# Patient Record
Sex: FEMALE | Race: OTHER | URBAN - METROPOLITAN AREA
[De-identification: names, ages, dates, MRNs, and addresses within clinical notes are randomized per-mention and may not be internally consistent; named-entity substitution may affect disease eponyms.]

---

## 2021-10-22 LAB
AMB EXT ANTIBODY SCREEN: NEGATIVE
AMB EXT CHLAMYDIA, NUCLEIC ACID AMP: NEGATIVE
AMB EXT GONOCOCCUS, NUCLEIC ACID AMP: NEGATIVE
AMB EXT HBSAG SCREEN: NEGATIVE
AMB EXT HEMATOCRIT: 39.9
AMB EXT HEMOGLOBIN: 13.4
AMB EXT HIV AG AB COMBO: NON REACTIVE
AMB EXT MCV: 92
AMB EXT PLATELETS: 206
AMB EXT RH FACTOR: POSITIVE
AMB EXT TREPONEMAL ANTIBODIES: NON REACTIVE

## 2022-04-09 ENCOUNTER — OFFICE VISIT (OUTPATIENT)
Dept: OBGYN CLINIC | Facility: CLINIC | Age: 27
End: 2022-04-09
Payer: MEDICAID

## 2022-04-09 VITALS
HEART RATE: 97 BPM | HEIGHT: 64 IN | BODY MASS INDEX: 22.2 KG/M2 | WEIGHT: 130 LBS | SYSTOLIC BLOOD PRESSURE: 96 MMHG | DIASTOLIC BLOOD PRESSURE: 65 MMHG

## 2022-04-09 DIAGNOSIS — N92.6 MISSED MENSES: Primary | ICD-10-CM

## 2022-04-09 PROBLEM — O34.219 PREVIOUS CESAREAN DELIVERY AFFECTING PREGNANCY: Status: ACTIVE | Noted: 2021-10-22

## 2022-04-09 PROCEDURE — 3008F BODY MASS INDEX DOCD: CPT | Performed by: ADVANCED PRACTICE MIDWIFE

## 2022-04-09 PROCEDURE — 3078F DIAST BP <80 MM HG: CPT | Performed by: ADVANCED PRACTICE MIDWIFE

## 2022-04-09 PROCEDURE — 99203 OFFICE O/P NEW LOW 30 MIN: CPT | Performed by: ADVANCED PRACTICE MIDWIFE

## 2022-04-09 PROCEDURE — 3074F SYST BP LT 130 MM HG: CPT | Performed by: ADVANCED PRACTICE MIDWIFE

## 2022-04-09 RX ORDER — PRENATAL VIT/IRON FUM/FOLIC AC 27MG-0.8MG
1 TABLET ORAL DAILY
COMMUNITY

## 2022-04-11 ENCOUNTER — NURSE ONLY (OUTPATIENT)
Dept: OBGYN CLINIC | Facility: CLINIC | Age: 27
End: 2022-04-11
Payer: MEDICAID

## 2022-04-11 DIAGNOSIS — Z34.83 ENCOUNTER FOR SUPERVISION OF OTHER NORMAL PREGNANCY IN THIRD TRIMESTER: Primary | ICD-10-CM

## 2022-04-13 NOTE — TELEPHONE ENCOUNTER
Spoke to patient, advised attempted to call [facility] for OP report and was unable to make the call. Patient advised she is to reach out to them and have them fax report to our office. Advised if they are unable to send, patient is to get their fax # and notify the office so that we can send the JAMES to them. Patient also advised she is to reach out to the office in [facility] she had her ultrasound done 12/2021 and have results fax to out office.  Patient agrees with plan and verbally understands

## 2022-04-13 NOTE — PROGRESS NOTES
Tigre Vasquez, is at 31w2d, here for her NOB visit. Currently, she is feeling well. Denies 3rd trimester danger signs. Accepts Tdap today. Has reviewed the information regarding  and wants TOLAC. Vital signs and weight reviewed  See flowsheets & Prenatal Physical    Patient Active Problem List:     Previous  delivery affecting pregnancy     Today's Assessment/Plan: 3T labs in process, Tdap given. JAMES today for prenatal records and prior OP report. Next visit: 2 weeks    Reviewed:   Prenatal visit schedule  Recommendations and rationale for Tdap in pregnancy  Emergency contact info  3rd trimester precautions and expectations   labor precautions  Kick counts  Danger signs    Pt verbalized understanding. All questions answered.  No barriers to learning identified

## 2022-04-13 NOTE — TELEPHONE ENCOUNTER
----- Message from Dariusz Leyva CNM sent at 4/13/2022 11:31 AM CDT -----  Normal glucose results in pregnancy. Please call .

## 2022-04-13 NOTE — TELEPHONE ENCOUNTER
Notified pt of results per MBW using Language line  #[id number].  Pt verbalized an understanding & agrees w/ plan

## 2022-04-27 NOTE — PROGRESS NOTES
Active fetus  Denies any vaginal bleeding, leaking of fluid or vaginal discharge. No signs signs of PTL. Reviewed S&S of PTL  Warning signs reviewed  All questions answered.   Reports back, hip and leg pain- PT referral  Reports racing heartbeat -cardiology referral  Reports thick white vaginal discharge - vaginosis screen completed

## 2022-04-30 NOTE — TELEPHONE ENCOUNTER
----- Message from Nanci Latif CNM sent at 4/29/2022  4:40 PM CDT -----  Mongolian speaking: Yeast on vaginal culture use medication as prescribed

## 2022-05-19 NOTE — PROGRESS NOTES
Feeling well. Endorses regular fetal movement. Denies LOF, vaginal bleeding, contractions. Discussed importance of records from prior . They have tried calling the clinic but have not gotten an answer. Encouraged them to keep trying. Also discussed consult with MDs. Provided contact information and instructed to schedule for this week. GBS today. Reviewed warning signs and when to call.  VLADISLAV 1wk

## 2022-05-23 NOTE — TELEPHONE ENCOUNTER
----- Message from Amrit Quintero CNM sent at 5/23/2022  5:51 PM CDT -----  Nigerien speaking  your Beta Hemolytic Strep Group B or GBS testing is negative, therefore you will not need routine antibiotics in labor. Please call the office if you have any questions. We look forward to seeing you for your next visit.

## 2022-05-24 NOTE — TELEPHONE ENCOUNTER
Was informed by phone room, they spoke to patient's  was was advised patient has [employer] choice as medicaid replacement.  believes [employer] [facility] will be active after June 1, 2022. PA form submitted for visit schedule 5/25/22 with olegario.

## 2022-05-25 NOTE — PROGRESS NOTES
Active fetus Increasing BH contractions. Denies any leaking of fluid or bleeding. Reviewed S&S labor  Warning signs reviewed  All questions answered.   Pt has appt with Dr Lenz Later tomorrow

## 2022-05-25 NOTE — TELEPHONE ENCOUNTER
Patient with [employer] better [employer] plan. Prior authorization initiated by Manish CLARKE for  CPT code 0829P. Fax received from [facility] that prior authorization is not required for CPT code [phone]. Sent to scanning.

## 2022-05-26 NOTE — TELEPHONE ENCOUNTER
Please schedule the following surgery:    Procedure: Repeat   Assist: (Y/N or none) Yes  Date: 2022. Dr Marguerite Oliveira On Call. Dx: Previous   Pre-op appt: (Y/N or n/a) n/a  Admission type: (IN/OUT/OBVS) OBVS  Department of discharge(SDS/Floor): [facility]  Expected length of stay: 4 days  Procedure length time (please enter amount you are requesting): 1.5 hours  Recovery time (patients always ask): 6 weeks  Medical Clearance: (Y/N) No      ALL Medicaid/including BCBS community: Tubal/ Hyst form MUST be signed (30 days): COVID19 Lab 5062 needs to be placed for all C-sections, IOL & Versions     Message to nurses:  Patient to be NPO after midnight and to go for Pre-Op and Covid Testing prior to day of Surgery. Call [facility] to schedule surgery on Date written above.

## 2022-05-26 NOTE — TELEPHONE ENCOUNTER
Scheduled for 06/06/2022 at 0930. Orders placed for a CBC, Type&Screen, and a Covid test.     Called pt using  [id number]. Pt informed of above. Aware to be NPO after midnight.

## 2022-06-02 NOTE — PROGRESS NOTES
Patient feels like she is leaking amniotic fluid. She reports that her belly gets hard and she feels cramps occasionally. Reports good fetal movement. Staying hydrated, no other pregnancy complaints. Sterile speculum exam showed no fluid pooling, Nitrazine negative, no ferning under microscope. Cervix closed and thick, -3, cephalic. Reviewed plan of repeat  scheduled for 2022 with Dr. Tye Roberts. Instructed to call for signs of labor. Pt verbalized understanding and questions answered.

## 2022-06-06 NOTE — PLAN OF CARE
Problem: PAIN - ADULT  Goal: Verbalizes/displays adequate comfort level or patient's stated pain goal  Description: INTERVENTIONS:  - Encourage pt to monitor pain and request assistance  - Assess pain using appropriate pain scale  - Administer analgesics based on type and severity of pain and evaluate response  - Implement non-pharmacological measures as appropriate and evaluate response  - Consider cultural and social influences on pain and pain management  - Manage/alleviate anxiety  - Utilize distraction and/or relaxation techniques  - Monitor for opioid side effects  - Notify MD/LIP if interventions unsuccessful or patient reports new pain  - Anticipate increased pain with activity and pre-medicate as appropriate  6/6/2022 1503 by Brenna Lara RN  Outcome: Progressing  6/6/2022 1503 by Brenna Lara RN  Outcome: Progressing     Problem: ANXIETY  Goal: Will report anxiety at manageable levels  Description: INTERVENTIONS:  - Administer medication as ordered  - Teach and rehearse alternative coping skills  - Provide emotional support with 1:1 interaction with staff  6/6/2022 1503 by Brenna Lara RN  Outcome: Progressing  6/6/2022 1503 by Brenna Lara RN  Outcome: Progressing     Problem: POSTPARTUM  Goal: Long Term Goal:Experiences normal postpartum course  Description: INTERVENTIONS:  - Assess and monitor vital signs and lab values. - Assess fundus and lochia. - Provide ice/sitz baths for perineum discomfort. - Monitor healing of incision/episiotomy/laceration, and assess for signs and symptoms of infection and hematoma. - Assess bladder function and monitor for bladder distention.  - Provide/instruct/assist with pericare as needed. - Provide VTE prophylaxis as needed. - Monitor bowel function.  - Encourage ambulation and provide assistance as needed. - Assess and monitor emotional status and provide social service/psych resources as needed.   - Utilize standard precautions and use personal protective equipment as indicated. Ensure aseptic care of all intravenous lines and invasive tubes/drains.  - Obtain immunization and exposure to communicable diseases history. Outcome: Progressing  Goal: Optimize infant feeding at the breast  Description: INTERVENTIONS:  - Initiate breast feeding within first hour after birth. - Monitor effectiveness of current breast feeding efforts. - Assess support systems available to mother/family.  - Identify cultural beliefs/practices regarding lactation, letdown techniques, maternal food preferences. - Assess mother's knowledge and previous experience with breast feeding.  - Provide information as needed about early infant feeding cues (e.g., rooting, lip smacking, sucking fingers/hand) versus late cue of crying.  - Discuss/demonstrate breast feeding aids (e.g., infant sling, nursing footstool/pillows, and breast pumps). - Encourage mother/other family members to express feelings/concerns, and actively listen. - Educate father/SO about benefits of breast feeding and how to manage common lactation challenges. - Recommend avoidance of specific medications or substances incompatible with breast feeding.  - Assess and monitor for signs of nipple pain/trauma. - Instruct and provide assistance with proper latch. - Review techniques for milk expression (breast pumping) and storage of breast milk. Provide pumping equipment/supplies, instructions and assistance, as needed. - Encourage rooming-in and breast feeding on demand.  - Encourage skin-to-skin contact. - Provide LC support as needed. - Assess for and manage engorgement. - Provide breast feeding education handouts and information on community breast feeding support. Outcome: Progressing  Goal: Establishment of adequate milk supply with medication/procedure interruptions  Description: INTERVENTIONS:  - Review techniques for milk expression (breast pumping).    - Provide pumping equipment/supplies, instructions, and assistance until it is safe to breastfeed infant. Outcome: Progressing  Goal: Appropriate maternal -  bonding  Description: INTERVENTIONS:  - Assess caregiver- interactions. - Assess caregiver's emotional status and coping mechanisms. - Encourage caregiver to participate in  daily care. - Assess support systems available to mother/family.  - Provide /case management support as needed.   Outcome: Progressing

## 2022-06-06 NOTE — PLAN OF CARE
Problem: BIRTH - VAGINAL/ SECTION  Goal: Fetal and maternal status remain reassuring during the birth process  Description: INTERVENTIONS:  - Monitor vital signs  - Monitor fetal heart rate  - Monitor uterine activity  - Monitor labor progression (vaginal delivery)  - DVT prophylaxis (C/S delivery)  - Surgical antibiotic prophylaxis (C/S delivery)  Outcome: Progressing     Problem: PAIN - ADULT  Goal: Verbalizes/displays adequate comfort level or patient's stated pain goal  Description: INTERVENTIONS:  - Encourage pt to monitor pain and request assistance  - Assess pain using appropriate pain scale  - Administer analgesics based on type and severity of pain and evaluate response  - Implement non-pharmacological measures as appropriate and evaluate response  - Consider cultural and social influences on pain and pain management  - Manage/alleviate anxiety  - Utilize distraction and/or relaxation techniques  - Monitor for opioid side effects  - Notify MD/LIP if interventions unsuccessful or patient reports new pain  - Anticipate increased pain with activity and pre-medicate as appropriate  Outcome: Progressing     Problem: ANXIETY  Goal: Will report anxiety at manageable levels  Description: INTERVENTIONS:  - Administer medication as ordered  - Teach and rehearse alternative coping skills  - Provide emotional support with 1:1 interaction with staff  Outcome: Progressing     Problem: Patient Centered Care  Goal: Patient preferences are identified and integrated in the patient's plan of care  Description: Interventions:  - What would you like us to know as we care for you?  N/a  - Provide timely, complete, and accurate information to patient/family  - Incorporate patient and family knowledge, values, beliefs, and cultural backgrounds into the planning and delivery of care  - Encourage patient/family to participate in care and decision-making at the level they choose  - Honor patient and family perspectives and choices  Outcome: Progressing

## 2022-06-06 NOTE — ADDENDUM NOTE
Addendum  created 06/06/22 1559 by Curlene Castleman, MD    Clinical Note Signed, Intraprocedure Blocks edited

## 2022-06-06 NOTE — ANESTHESIA POSTPROCEDURE EVALUATION
Patient: Ashlyn Waggoner    Procedure Summary     Date: 22 Room / Location: [facility] L+D OR  [facility] L+D OR    Anesthesia Start: 1004 Anesthesia Stop: 105    Procedure:  SECTION (N/A ) Diagnosis: (H/o previous )    Surgeons: Mark Correa MD Anesthesiologist: Maynor Nagel MD    Anesthesia Type: spinal ASA Status: 2          Anesthesia Type: spinal    Vitals Value Taken Time   BP 96/58 22 1059   Temp 97.6 22 1059   Pulse 89 22 1059   Resp 16 22 1059   SpO2 99% 22 1059       [facility] AN Post Evaluation:   Patient Evaluated in PACU  Patient Participation: complete - patient participated  Level of Consciousness: awake  Pain Score: 0  Pain Management: adequate  Airway Patency:patent  Dental exam unchanged from preop  Yes    Cardiovascular Status: acceptable  Respiratory Status: acceptable  Postoperative Hydration acceptable      Cheri Lynn MD  2022 10:59 AM

## 2022-06-06 NOTE — L&D DELIVERY NOTE
[facility]    Post-Operative Note    [address] Patient Status:  Inpatient    6/10/1995 MRN [id number]   Location [address]  Attending Aditi Gibbs MD   Hosp Day # 0 PCP No primary care provider on file. Indications: prior c/s declines raad   Pre-operative Diagnosis: 39w0d  Post-operative Diagnosis: same as above    Procedure: Procedure(s):   SECTION    Findings:  1. There was viable male delivered in the vtx position. 2 There was a normal appearing placenta. 3. There was a normal appearing uterus, tubes, and ovaries. Complications: None; patient tolerated the procedure well. Surgeon(s) and Role:     * Aditi Gibbs MD - Primary     * Verner Forte, MD - Surgical Assistant    Procedure Details:    After obtaining consent, the patient was taken to the operative suite where she was administered spinal anesthesia. Fetal heart tones were obtained. Epperson catheter was anchored and the patient was prepped and draped in a sterile fashion. Time-out was performed. Anesthesia was checked and found to be adequate. A Pfannenstiel skin incision was made and carried down sharply to the rectus fascia. The rectus fascia was incised in midline and extended bluntly. Peritoneum was identified and entered bluntly. A Mobius retractor was then placed. In the lower uterine segment, a low transverse incision was made with a scalpel and extended bluntly. Amniotomy was performed and clear fluid was noted. The infant was delivered in the vertex presentation without difficulty. Infant was vigorous and crying on the surgical field. Delayed cord clamping x 30 seconds was performed. Cord doubly clamped and cut. Infant was taken to the  team in attendance. Placenta was then extracted from the uterus manually after collecting cord blood. Moist lap sponges were used to curette the endometrium.   The hysterotomy was then grasped with ring forceps and reapproximated using a #1 Vicryl suture in a running locked fashion. A second layer of #1 Vicryl  was used to imbricate the first layer and close the bladder flap. Good hemostasis was noted. Lateral gutters were wiped clear free of blood clot. Tubes and ovaries were inspected and normal.  Hysterotomy was inspected for a final time and again hemostatic. Interceed was then placed over the hysterotomy. Subfascial tissue was inspected and hemostatic. The fascia was then reapproximated using a 0 looped PDS suture in a running fashion. Subcutaneous tissue was wiped clean of blood and clot and the subcutaneous tissue was closed using a 0 plain suture in a running fashion. The skin was then closed using a subcuticular stitch with a 3-0 Monocryl. Dermabond was placed over the incision. Patient tolerated the procedure well. She was transferred to recovery room in stable condition. There were no immediate complications. Dr Jeremiah Arteaga was present for the entire procedure and assisted with dissection in the abdominal cavity, delivery of the baby and the repair of the abdominal cavity.        Alexi Jeffery [[id number]]    Labor Events     labor?: No  Antibiotics received during labor?: No  Rupture type: AROM  Fluid color: Clear  Induction: None  Augmentation: None  Intrapartum & labor complications: None      Presentation    Presentation: Vertex  Position: Right Occiput Anterior     Operative Delivery    Operative Vaginal Delivery: No            Shoulder Dystocia    Shoulder Dystocia: No     Anesthesia    Method: Spinal          [place] Delivery    Head delivery date/time: 2022 10:30:47   Delivery date/time:  22 10:30:50   Delivery type: Caesarean Section    Details:  Trial of labor after : No    categorization: Repeat    priority: scheduled   Indications for : Prior Uterine Surgery   Skin incision type: 1 Pfannenstiel   Uterine Incision type: Low Transverse      Delivery location: delivery room  Delivery Room Temperature: 74     Delivery Providers    Delivering Clinician: Katiuska Musa MD   Delivery personnel:  Provider Role   Rosi Rowland, RN Baby Nurse   Jodi Mosley, SJ Delivery Nurse   Tasia Mittal Surgical Tech   Lilia Fang MD Surgeon   Ella Mccullough MD Neonatologist   Juana Eagle MD Anesthesiologist         Cord    Vessels: 3 Vessels  Complications: None  # of loops: 0  Timed cord clamping: Yes  Time in sec: 60  Cord blood disposition: to lab  Gases sent?: No     Resuscitation    Method: None     [place] Measurements    Weight: 3110 g 6 lb 13.7 oz Length: 52.1 cm   Head circum. : 35 cm          Placenta    Date/time: 2022 1031  Removal: Manual Removal  Appearance: Intact  Disposition: Pathology     Apgars    Living status: Living   Apgar Scoring Key:    0 1 2    Skin color Blue or pale Acrocyanotic Completely pink    Heart rate Absent <100 bpm >100 bpm    Reflex irritability No response Grimace Cry or active withdrawal    Muscle tone Limp Some flexion Active motion    Respiratory effort Absent Weak cry; hypoventilation Good, crying              1 Minute:  5 Minute:  10 Minute:  15 Minute:  20 Minute:    Skin color: 1  1       Heart rate: 2  2       Reflex irritablity: 2  2       Muscle tone: 2  2       Respiratory effort: 2  2       Total: 9  9          Apgars assigned by: JAKUB VANESSA     Skin to Skin    Skin to skin initiated date/time: 2022 1113  Skin to skin with:  Mother     Vaginal Count    No data filed     Delivery (Maternal)    Episiotomy: None  Perineal lacerations: None    Vaginal laceration?: No    Cervical laceration?: No    Clitoral laceration?: No Repaired?: No   Quantitative blood loss (mL): 505                Genny Rodriguez MD  2022  11:32 AM

## 2022-06-06 NOTE — PROGRESS NOTES
Received patient into room 366 via cart . Bedside shift report received from [employer]. Pt transferred to bed. Bed in lowest and locked position. Side rails up x2. VSS. IV site unremarkable. Baby present in open crib. ID bands matched with L&D RN. Patient and family oriented to unit, room and call light within reach. Safety measures in place, POC followed. Will continue to monitor per protocol. Advised to call for assistance to bathroom.

## 2022-06-06 NOTE — ANESTHESIA PROCEDURE NOTES
Spinal Block  Performed by: Nathan Boateng MD  Authorized by: Nathan Boateng MD       General Information and Staff    Start Time:  6/6/2022 10:08 AM  End Time:  6/6/2022 10:11 AM  Anesthesiologist:  Nathan Boateng MD  Performed by:   Anesthesiologist  Patient Location:  OB  Site identification: surface landmarks    Reason for Block: at surgeon's request, post-op pain management and surgical anesthesia    Preanesthetic Checklist: patient identified, IV checked, risks and benefits discussed, monitors and equipment checked, pre-op evaluation, timeout performed, anesthesia consent and sterile technique used      Procedure Details    Patient Position:  Sitting  Prep: ChloraPrep and patient draped    Monitoring:  Cardiac monitor, heart rate and continuous pulse ox  Approach:  Midline  Location:  L3-4  Injection Technique:  Single-shot    Needle    Needle Type:  Pencil-tip  Needle Gauge:  24 G  Needle Length:  3.5 in    Assessment    Sensory Level:  T4  Events: clear CSF, CSF aspirated, well tolerated and blood negative      Additional Comments

## 2022-06-06 NOTE — PROGRESS NOTES
Pt is a 27year old female admitted to TR5/TR5-A. Patient presents with:  Scheduled      Pt is  39w0d intra-uterine pregnancy. History obtained, consents signed. Oriented to room, staff, and plan of care.

## 2022-06-07 NOTE — LACTATION NOTE
LACTATION NOTE - MOTHER      Evaluation Type: Inpatient    Problems identified  Problems identified: Knowledge deficit    Maternal history  Maternal history: Caesarean section;PPH  Other/comment: PPH 1500ml & abx    Breastfeeding goal  Breastfeeding goal: To maintain breast milk feeding per patient goal    Maternal Assessment  Bilateral Breasts: Soft;Symmetrical  Bilateral Nipples: Everted;WNL  Prior breastfeeding experience (comment below): First baby to breast;Multip  Prior BF experience: comment: None  Breastfeeding Assistance: Breastfeeding assistance provided with permission    Pain assessment  Location/Comment: Nipples  Pain scale comment: denies  Treatment of Sore Nipples: Coconut oil; Lanolin;Deeper latch techniques; Expressed breast milk (reinforce preventatively)    Guidelines for use of:  Current use of pump[de-identified] None  Other (comment): Assisted mom to latch infant in a football hold position on the  left breast. Demonstrated proper postioning techniques and used supportive pillows to help facilitate deep latch. Mom verbalized improved latch and notable difference in nipple discomfort. Deep latch was achieve, with strong coordinated sucks/swalows noted. Demonstrated hand compression & encouraged compression to increase milk transfer. Reinforced I&O expectations, adequate signs of lactation,  behaviors, feeding cues, gentle wake stimulation, Reinforced basic  breastfeeding edu, supply & demand. stages of milk production, frequency, when to expect milk to come in. . Encourage to call prn.

## 2022-06-07 NOTE — PROGRESS NOTES
1518 This RN went into the room to assess pt. Fundus seemed well above Umbilicus. Called Charge RN to assess and verify with this RN. Charge RN expressed a large amount of blood/clots. [facility] emergency called. Notified Dr. Omer Hernández who came to examine patient at bedside and manually expressed clots. Refer to [facility] for meds administered. Flowsheet for QBL. Continue to monitor patient/and bleeding/ no further orders at this time. 

## 2022-06-07 NOTE — OPERATIVE REPORT
[facility]    Post-Operative Note    [address] Patient Status:  Inpatient    6/10/1995 MRN [id number]   Location [address] Attending Mark Correa MD    Day # 0 PCP No primary care provider on file. Indications: prior c/s declines raad   Pre-operative Diagnosis: 39w0d  Post-operative Diagnosis: same as above    Procedure: Procedure(s):   SECTION    Findings:  1. There was viable male delivered in the vtx position. 2 There was a normal appearing placenta. 3. There was a normal appearing uterus, tubes, and ovaries. Complications: None; patient tolerated the procedure well. Surgeon(s) and Role:     * Mark Correa MD - Primary     * Kaylyn Stone MD - Surgical Assistant    Procedure Details:    After obtaining consent, the patient was taken to the operative suite where she was administered spinal anesthesia. Fetal heart tones were obtained. Epperson catheter was anchored and the patient was prepped and draped in a sterile fashion. Time-out was performed. Anesthesia was checked and found to be adequate. A Pfannenstiel skin incision was made and carried down sharply to the rectus fascia. The rectus fascia was incised in midline and extended bluntly. Peritoneum was identified and entered bluntly. A Mobius retractor was then placed. In the lower uterine segment, a low transverse incision was made with a scalpel and extended bluntly. Amniotomy was performed and clear fluid was noted. The infant was delivered in the vertex presentation without difficulty. Infant was vigorous and crying on the surgical field. Delayed cord clamping x 30 seconds was performed. Cord doubly clamped and cut. Infant was taken to the  team in attendance. Placenta was then extracted from the uterus manually after collecting cord blood. Moist lap sponges were used to curette the endometrium.   The hysterotomy was then grasped with ring forceps and reapproximated using a #1 Vicryl suture in a running locked fashion. A second layer of #1 Vicryl  was used to imbricate the first layer and close the bladder flap. Good hemostasis was noted. Lateral gutters were wiped clear free of blood clot. Tubes and ovaries were inspected and normal.  Hysterotomy was inspected for a final time and again hemostatic. Interceed was then placed over the hysterotomy. Subfascial tissue was inspected and hemostatic. The fascia was then reapproximated using a 0 looped PDS suture in a running fashion. Subcutaneous tissue was wiped clean of blood and clot and the subcutaneous tissue was closed using a 0 plain suture in a running fashion. The skin was then closed using a subcuticular stitch with a 3-0 Monocryl. Dermabond was placed over the incision. Patient tolerated the procedure well. She was transferred to recovery room in stable condition. There were no immediate complications. Dr Lesa Niño was present for the entire procedure and assisted with dissection in the abdominal cavity, delivery of the baby and the repair of the abdominal cavity.        Venda Gaucher, Boy [[id number]]    Labor Events     labor?: No  Antibiotics received during labor?: No  Rupture type: AROM  Fluid color: Clear  Induction: None  Augmentation: None  Intrapartum & labor complications: None     [place] Presentation    Presentation: Vertex  Position: Right Occiput Anterior     Operative Delivery    Operative Vaginal Delivery: No            Shoulder Dystocia    Shoulder Dystocia: No     Anesthesia    Method: Spinal          [place] Delivery    Head delivery date/time: 2022 10:30:47   Delivery date/time:  22 10:30:50   Delivery type: Caesarean Section    Details:  Trial of labor after : No    categorization: Repeat    priority: scheduled   Indications for : Prior Uterine Surgery   Skin incision type: 1 Pfannenstiel   Uterine Incision type: Low Transverse      Delivery location: delivery room  Delivery Room Temperature: 74     Delivery Providers    Delivering Clinician: Mary Larios MD   Delivery personnel:  Provider Role   Berry Bright, RN Baby Nurse   Marija Bailey, RN Delivery Nurse   Maylin Henderson Surgical Tech   Chandra Naqvi MD Surgeon   Hien Rutherford MD Neonatologist   Azam Michael MD Anesthesiologist         Cord    Vessels: 3 Vessels  Complications: None  # of loops: 0  Timed cord clamping: Yes  Time in sec: 60  Cord blood disposition: to lab  Gases sent?: No     Resuscitation    Method: None      Measurements    Weight: 3110 g 6 lb 13.7 oz Length: 52.1 cm   Head circum. : 35 cm          Placenta    Date/time: 2022 1031  Removal: Manual Removal  Appearance: Intact  Disposition: Pathology     Apgars    Living status: Living   Apgar Scoring Key:    0 1 2    Skin color Blue or pale Acrocyanotic Completely pink    Heart rate Absent <100 bpm >100 bpm    Reflex irritability No response Grimace Cry or active withdrawal    Muscle tone Limp Some flexion Active motion    Respiratory effort Absent Weak cry; hypoventilation Good, crying              1 Minute:  5 Minute:  10 Minute:  15 Minute:  20 Minute:    Skin color: 1  1       Heart rate: 2  2       Reflex irritablity: 2  2       Muscle tone: 2  2       Respiratory effort: 2  2       Total: 9  9          Apgars assigned by: JAKUB VANESSA     Skin to Skin    Skin to skin initiated date/time: 2022 1113  Skin to skin with:  Mother     Vaginal Count    No data filed     Delivery (Maternal)    Episiotomy: None  Perineal lacerations: None    Vaginal laceration?: No    Cervical laceration?: No    Clitoral laceration?: No Repaired?: No   Quantitative blood loss (mL): 505                Lynda Alan MD  2022  11:32 AM

## 2022-06-07 NOTE — LACTATION NOTE
This note was copied from a baby's chart. LACTATION NOTE - INFANT    Evaluation Type  Evaluation Type: Inpatient    Problems & Assessment  Problems Diagnosed or Identified: Latch difficulty  Infant Assessment: Skin color: pink or appropriate for ethnicity; Minimal hunger cues present  Muscle tone: Appropriate for GA    Feeding Assessment  Summary Current Feeding: Adlib;Breastfeeding exclusively  Breastfeeding Assessment: Assisted with breastfeeding w/mother's permission;Coordinated suck/swallow;Deep latch achieved and observed;Calm and ready to breastfeed; Tolerated feeding well  Breastfeeding Positions: cross cradle  Latch: Grasps breast, tongue down, lips flanged, rhythmic sucking  Audible Sucks/Swallows: Spontaneous and intermittent (24 hours old)  Type of Nipple: Everted (after stimulation)  Comfort (Breast/Nipple): Soft/non-tender  Hold (Positioning): Full assist, teach one side, mother does other, staff holds  [facility] Score: 9

## 2022-06-07 NOTE — CM/SW NOTE
SW informed [facility] of pt's Medicaid status and provided PMAD/WIC resources [place] [facility] [place]     RHYS Byrd, [facility]  Social Work   GIQ:#[id number]

## 2022-06-07 NOTE — LACTATION NOTE
This note was copied from a baby's chart. LACTATION NOTE - INFANT    Evaluation Type  Evaluation Type: Inpatient    Problems & Assessment  Problems Diagnosed or Identified: Latch difficulty;Sleepy  Infant Assessment: Skin color: pink or appropriate for ethnicity; Minimal hunger cues present  Muscle tone: Appropriate for GA    Feeding Assessment  Summary Current Feeding: Adlib;Breastfeeding exclusively  Breastfeeding Assessment: Assisted with breastfeeding w/mother's permission;Calm and ready to breastfeed;Deep latch achieved and observed;Sustained nutrititive latch w/audible swallows; Coordinated suck/swallow  Breastfeeding Positions: left breast;right breast;cradle;football  Latch: Repeated attempts, hold nipple in mouth, stimulate to suck  Audible Sucks/Swallows: Spontaneous and intermittent (24 hours old)  Type of Nipple: Everted (after stimulation)  Comfort (Breast/Nipple): Soft/non-tender  Hold (Positioning): No assist from staff, mother able to position/hold infant  LATCH Score: 9  Other (comment): Assisted mom to latch infant in a football hold position on the  left breast. Demonstrated proper postioning techniques and used supportive pillows to help facilitate deep latch. Mom verbalized improved latch and notable difference in nipple discomfort. Deep latch was achieve, with strong coordinated sucks/swalows noted. Demonstrated hand compression & encouraged compression to increase milk transfer. Reinforced I&O expectations, adequate signs of lactation,  behaviors, feeding cues, gentle wake stimulation, Reinforced basic  breastfeeding edu, supply & demand. stages of milk production, frequency, when to expect milk to come in. . Encourage to call prn.

## 2022-06-07 NOTE — LACTATION NOTE
LACTATION NOTE - MOTHER      Evaluation Type: Inpatient    Problems identified  Problems identified: Knowledge deficit         Breastfeeding goal  Breastfeeding goal: To maintain breast milk feeding per patient goal    Maternal Assessment  Bilateral Breasts: Symmetrical;Soft  Bilateral Nipples: WNL; Everted  Prior breastfeeding experience (comment below): Multip  Breastfeeding Assistance: Breastfeeding assistance provided with permission         Guidelines for use of: Other (comment): Mother feeding in an unsupportive position when I entered the room. Educated on cross cradle position and deep latch techniques. Deep latch acheived. Infant latches with ease. Provided  breastfeeding education.

## 2022-06-08 NOTE — LACTATION NOTE
LACTATION NOTE - MOTHER      Evaluation Type: Inpatient    Problems identified  Problems identified: Knowledge deficit;Milk supply not WNL  Milk supply not WNL: Reduced (potential)  Problems Identified Other: Formula supplement         Breastfeeding goal  Breastfeeding goal: To maintain breast milk feeding per patient goal    Maternal Assessment  Prior breastfeeding experience (comment below): First baby to breast;Multip  Breastfeeding Assistance: Breastfeeding assistance provided with permission    Pain assessment  Pain scale comment: denies    Guidelines for use of:  Breast pump type: Ameda Platinum  Current use of pump[de-identified] Breast pump resource handout given  Suggested use of pump: Pump each time a supplement is offered;Pump if infant is not latching to breast  Other (comment): Mom reports BF going well, infant BF every 2-3 hours and supplemeting with about 10 ml formula however breast pump use inconsistent. Pumping guidelines discussed and encouraged to pump when supplement given to protect milk supply. Reviewed  breastfeeding education, including I/O and frequent feedings. Enc to call [facility] with questions or to schedule outpatient visit.

## 2022-06-08 NOTE — DISCHARGE SUMMARY
[place] [facility]    Discharge Summary    [address] Patient Status:  Inpatient    6/10/1995 MRN [id number]   Location [facility] 3SE Attending Ilsa Berg MD   Hosp Day # 2 PCP No primary care provider on file. Date of Admission: 2022   Date of Discharge: 2022    Admitting Diagnosis: Repeat  HERMAN    Pregnancy    Disposition: Home    Discharge Diagnosis: . Active Problems:    Previous  delivery affecting pregnancy    Pregnancy    Delayed postpartum hemorrhage      Hospital Course:   Reason for Admission: Repeat     Discharge Physical Exam: General appearance:  alert, appears stated age and cooperative  INCISION/WOUND: clean, dry and intact  Pulmonary: clear to auscultation bilaterally  Breasts:  normal appearance, no masses or tenderness  Cardiovascular: S1, S2 normal, no murmur, click, rub or gallop, regular rate and rhythm  Abdominal: soft, non-tender; bowel sounds normal; no masses,  no organomegaly  Extremities: extremities normal, atraumatic, no cyanosis or edema    Hospital Course: S/P Repeat LTCS. S/P postpartum hemorrhage. S/P blood transfusion. Stable post-op course. Patient desires discharge on POD #2. Complications: postpartum hemorrhage. Surgical Procedures     Case IDs Date Procedure Surgeon Location Status    [id number] 22  SECTION Ilsa Berg MD [facility] L[facility]D OR [facility]        Pending Labs     Order Current Status    Miscellaneous - Non Inf Lab In process    Specimen to Pathology Tissue In process          Discharge Plan:   Discharge Condition: Stable    Current Discharge Medication List    New Orders    ibuprofen 600 MG Oral Tab  Take 1 tablet (600 mg total) by mouth every 6 (six) hours as needed for Pain. Home Meds - Unchanged    Prenatal 27-0.8 MG Oral Tab  Take 1 tablet by mouth daily.               Discharge Diet: General diet    Discharge Activity: As tolerated    Follow up: Follow-up Information     Call [facility]. Specialty: GE PEDIATRICS  Why: As needed  Contact information:  [address] [facility] [address]  [facility]. Specialty: GE PEDIATRICS  Why: As needed  Contact information:  [address] [facility] [place] [address]  [phone]           Aleksandar Chan MD. Schedule an appointment as soon as possible for a visit in 2 weeks. Specialty: OBSTETRICS & GYNECOLOGY  Why: Post-Op Exam  Contact information:  [facility]  [phone]             Aleksandar Chan MD. Schedule an appointment as soon as possible for a visit in 6 weeks. Specialty: OBSTETRICS & GYNECOLOGY  Why: postpartum exam  Contact information:  [facility]  [phone]                                 Senait Radford MD  6/8/2022

## 2022-06-09 NOTE — PROGRESS NOTES
1st attempt OBGYN Post Partum apt request  (delivered 06/06)    Dr Julius Young [id number]  [phone]  Apt made:   2w w/Dr Hieu Levy - Mon 06/20 @1:15pm  6w w/Annette Hernandez - Mon 07/18 @1:00pm  Confirmed w/pt  Closing encounter

## 2022-06-20 NOTE — PROGRESS NOTES
No chief complaint on file. HPI: Nay Colon is a 32year old [de-identified] P female who presents for a postpartum visit. Delivery Date: 06/06/2022    Delivering Physician: Dr. Lindy Salinas    Type of Delivery: LTCS    Gestational Age: 39w0d    Prenatal Complications: n/a    Delivery Complications: n/a    Episiotomy/Laceration: n/a    Bleeding: n/a    Rubella Status/Vaccine: imm    Baby's Name: Bailey Whitten    [de-identified] Gender: Male    [de-identified] Birth Weight: 6lbs 13.7oz    Baby's Health: good health    Breast or Formula Feeding: both    Contraception: unsure    Depression Screen: scored 2    Last Pap: 08/28/2021 Annual needs to be scheduled    Concerns:no concerns    /60   Wt 122 lb (55.3 kg)   LMP 08/28/2021 (Exact Date)     Annual Physical Never done  Annual Depression Screen Never done  Pap Smear Never done  COVID-19 Vaccine(2 - Pfizer series) due on 11/12/2021  Influenza Vaccine Completed  Pneumococcal Vaccine: Birth to 64yrs Aged Out        Review of Systems   General: Not Present- Anorexia, Fatigue, Fever, Weight Gain > 10lbs. and Weight Loss > 10lbs. Zak Anis HEENT: Not Present- Headache, Visual Disturbances, Vertigo and Bleeding Gums. Breast: Not Present- Breast Mass, Breast Pain, Nipple Discharge and Nipple Pain. Gastrointestinal: Not Present- Abdominal Pain, Change in Bowel Habits, Nausea and Vomiting. Female Genitourinary: Not Present- Dysuria, Flank Pain, Frequency, Hematuria, Incontinence, Pelvic Pain, Urgency, Vaginal Bleeding and Vaginal Discharge. Psychiatric: Not Present- Anxiety, Change in Sleep Pattern and Depression. Endocrine: Not Present- Libido Change. Hematology: Not Present- Anemia, Easy Bruising, Prolonged Bleeding and Spontaneous Bleeding. All other systems negative       Physical Exam   The physical exam findings are as follows:       General   General Appearance - Well Developed and Well Nourished.       Integumentary   Global Assessment: Upon inspection and palpation of skin surfaces of the - Genitalia, groin, buttocks: no rashes, perineal lesions or condyloma. Abdomen   Inspection: Inspection of the abdomen reveals - No Hernias. Incisional scars -  scar. Palpation/Percussion: Palpation and Percussion of the abdomen reveal - Non Tender, No hepatosplenomegaly and No Palpable abdominal masses. Female Genitourinary     External Genitalia   Clitoris - Normal.  Labia Majora: Lesions - Bilateral - None. Characteristics - Bilateral - Normal.  Labia Minora: Lesions - Bilateral - None. Characteristics - Bilateral - Normal.  Urethra: Characteristics - Normal. Discharge - None. Vulva: Characteristics - Normal. Lesions - None. Speculum & Bimanual   Vagina:   Vaginal Wall: - Normal.  Cervix: Characteristics - No Motion tenderness or Lesions. Uterus: Characteristics - Normal.  Adnexa: Characteristics - Bilateral - Normal. Masses - No Adnexal Masses. Rectal   Anorectal Exam: External - normal external exam.    Discussed postpartum care, expectation for menses, contraception, and pregnancy timing. Discussed with patient activity level postpartum and when she can resume all normal activities. Encourage patient to continue her prenatal vitamin until done breast-feeding. Patient desires ocp at this point for birth control. Patient scheduled for her routine GYN exam.       1. Encounter for postpartum visit    2. Antepartum anemia  - CBC, PLATELET; NO DIFFERENTIAL;  Future  - FERRITIN; Future

## 2022-06-30 NOTE — TELEPHONE ENCOUNTER
From: [address]  To: Madyson Valiente MD  Sent: 6/29/2022 9:02 PM CDT  Subject: Vance Ganser im Josephus Benjamin my c section is red and still sleping is it normal i can send a picture so you can see!  Thanks

## 2022-06-30 NOTE — TELEPHONE ENCOUNTER
Regarding: Colonel Liang  ----- Message from Conrad Morrissey sent at 6/30/2022  9:26 AM CDT -----       ----- Message sent from Mason Gastelum RN to Jo [address] at 6/30/2022  7:16 AM -----   [facility],     If there is fluid draining from your incision, foul odor, redness, it is warm to touch or you have a fever. You should be evaluated. Please call our office anytime after 8 am to speak with a nurse further about your incision and to possibly schedule an office visit. Please call [phone]. Kind regards,     Roseline Sweeney RN      ----- Message -----       From:Davon Doe Alvarez       Sent:6/29/2022  9:02 PM CDT         To:Lady Oliveira MD    Subject:Dariana Curiel my c section is red and still sleping is it normal i can send a picture so you can see!  Thanks

## 2022-07-01 NOTE — TELEPHONE ENCOUNTER
Patient states she could not make her appointment on yesterday she want a nurse to call her to see if she can be seen today . ... Danish interpretor needed

## 2022-07-01 NOTE — TELEPHONE ENCOUNTER
Patient name and  verified. Patient with a history of repeat c/s on 22 by TA contacted office complaining of redness to incision site. Per patient states incision looks like it has dehisced with pus. Denies odor, chills, or fever. Patient failed yesterdays appt for incision check and this RN unable to offer appt at [facility] today due to no availability on RN or provider's schedule. Patient scheduled for today with  Ta.

## 2022-07-13 NOTE — TELEPHONE ENCOUNTER
Patient delivered on 6/6. Annual on 8/4 needs to be rescheduled. Is this too soon for an annual? Please advise.

## 2022-07-14 NOTE — TELEPHONE ENCOUNTER
Patient name and  verified. Patient complaining of redness around incision site with yellow discharge. Denies fever, odor, or chills. Patient would like to have incision checked. Appointment made for 7/15/2022 with TA. Aware of time and location.

## 2022-10-03 NOTE — TELEPHONE ENCOUNTER
Talked to the pharmacist at the [facility] on [address] in [address] [place] [address]. Pt to receive Dicloxacillin 500 mg (two 250 mg tabs) QID x 7 days.

## 2023-04-25 NOTE — TELEPHONE ENCOUNTER
----- Message from Donita Mattson MD sent at 4/21/2023  7:41 PM CDT -----  Please notify patient that her vaginosis panel was positive for yeast.  I have sent a prescription to her pharmacy.

## 2023-04-27 NOTE — TELEPHONE ENCOUNTER
----- Message from Jill Pearce MD sent at 4/21/2023  7:41 PM CDT -----  Please notify patient that her vaginosis panel was positive for yeast.  I have sent a prescription to her pharmacy.
No vm set up
No voicemail set up. Unable to leave a message. [employer] message sent to kristen Sexton
VM not set up, unable to leave VM.
Lynn Linton  (RN)  2021 22:46:07

## 2023-06-07 NOTE — TELEPHONE ENCOUNTER
Pt is calling Pt had ultrasound of her breast fr breast pain .  Pt said there was nothing wrong but still having breast pain ,

## 2023-06-13 NOTE — TELEPHONE ENCOUNTER
PSR please call pt to schedule new pt consult, referral in epic   Spoke to patient, informed patient per mes culture was positive for yeast. Patient advised rx was sent to her pharmacy on 4/27. Patient stated she has not picked it up because she was waiting on us to notify her. Patient advised she is to contact her pharmacy to make sure it is ready to be picked up.  Patient agrees with plan and verbally understands

## 2023-07-24 NOTE — TELEPHONE ENCOUNTER
Pt saw Dr Gallo Daniel in June for itching & cuts in vaginal area. Doctor gave pt a cream. Pt is having the same symptoms. Pt wants to know if some different medicine .  Pt  Chencho Alejo is calling pt speaks Irish

## 2023-07-24 NOTE — TELEPHONE ENCOUNTER
Patient name and  verified. Patient complaining of white vaginal discharge and cuts to inside vagina from itching. Patient asking for Diflucan. Rx sent to pharmacy. Aware if symptoms persist patient to be evaluated in office.

## 2024-10-20 NOTE — DISCHARGE INSTRUCTIONS
Return for changes or worsening.  Call Dr. Lucas as soon as possible and see him on Tuesday as discussed.  Read all instructions.  Tylenol for pain.

## 2024-10-20 NOTE — ED INITIAL ASSESSMENT (HPI)
Pt 6wk pregnant c/o pain to back for 4 days. Also c/o frequent urination. Denies bleeding but reports a little brown discharge.

## 2025-02-17 NOTE — TELEPHONE ENCOUNTER
Patient verified name and     Patient 23w4d requesting to initiate prenatal care. Was having issues with insurance. Denies prior prenatal care.   HERMAN 2025 by ultrasound on 10/19/24 in the emergency room.  Patient informed message will be sent to providers for review. Voiced understanding and agreed.

## 2025-02-17 NOTE — TELEPHONE ENCOUNTER
Patient called to schedule missed menses. Last menstrual period 8/19. No prenatal care except prenatal vitamins. Please call with .

## 2025-02-18 NOTE — TELEPHONE ENCOUNTER
Pt name and  verified     Pt scheduled for new OB on  with Dr. Lucas. Pt aware of scheduling details.

## 2025-02-24 NOTE — PROGRESS NOTES
[facility] Doe Dietrich is a 29 year old female  Patient's last menstrual period was 2024.   Chief Complaint   Patient presents with    Prenatal Care     Pt presents for New OB visit LMP 2024   No c/o    OBSTETRICS HISTORY:     OB History    Para Term  AB Living   5 2 2 0 1 2   SAB IAB Ectopic Multiple Live Births   0 0 1 0 2      # Outcome Date GA Lbr Scott/2nd Weight Sex Type Anes PTL Lv   5 Current            4 Term 22 39w0d  6 lb 13.7 oz (3.11 kg) M Caesarean Spinal N LEBRON   3 Ectopic 21 4w0d             Birth Comments: treated with medication   2 Term 17 38w0d  7 lb 0.9 oz (3.2 kg) F Caesarean EPI  LEBRON      Complications: Fetal Intolerance   1                 GYNE HISTORY:     Pap Date: 10/07/22  Pap Result Notes: Normal   Pap Date: 10/07/22 (2025 11:36 AM)  Pap Result Notes: Normal (2025 11:36 AM)        Latest Ref Rng & Units 10/7/2022     2:31 PM   RECENT PAP RESULTS   INTERPRETATION/RESULT: Negative for intraepithelial lesion or malignancy Negative for intraepithelial lesion or malignancy          MEDICAL HISTORY:     Past Medical History:    Chicken pox    Childhood    Depression    No therapy or meds       SURGICAL HISTORY:     Past Surgical History:   Procedure Laterality Date           delivery only         SOCIAL HISTORY:     Social History     Socioeconomic History    Marital status:      Spouse name: Arnulfo Jaramillo    Number of children: 1    Years of education: 9    Highest education level: 9th grade   Occupational History    Occupation: Homemaker   Tobacco Use    Smoking status: Never     Passive exposure: Never    Smokeless tobacco: Never   Vaping Use    Vaping status: Never Used   Substance and Sexual Activity    Alcohol use: Never    Drug use: Never   Other Topics Concern    Blood Transfusions No    Caffeine Concern No    Occupational Exposure No    Special Diet No    Exercise Yes      Comment: walking     Seat Belt Yes     Social Drivers of Health     Food Insecurity: No Food Insecurity (2/24/2025)    NCSS - Food Insecurity     Worried About Running Out of Food in the Last Year: No     Ran Out of Food in the Last Year: No   Transportation Needs: No Transportation Needs (2/24/2025)    NCSS - Transportation     Lack of Transportation: No   Housing Stability: Not At Risk (2/24/2025)    NCSS - Housing/Utilities     Has Housing: Yes     Worried About Losing Housing: No     Unable to Get Utilities: No        FAMILY HISTORY:     Family History   Problem Relation Age of Onset    Diabetes Father        MEDICATIONS:       Current Outpatient Medications:     Prenatal Vit-Fe Fumarate-FA (PRENATAL VITAMINS) 28-0.8 MG Oral Tab, Take 1 tablet by mouth daily., Disp: 90 tablet, Rfl: 2    Ferrous Sulfate 325 (65 Fe) MG Oral Tab, Take 1 tablet (325 mg total) by mouth every morning before breakfast., Disp: 90 tablet, Rfl: 3    fluconazole (DIFLUCAN) 150 MG Oral Tab, Take 1 tablet (150 mg total) by mouth every 3 (three) days. (Patient not taking: Reported on 2/24/2025), Disp: 2 tablet, Rfl: 0    ALLERGIES:     Allergies[1]      REVIEW OF SYSTEMS:     Constitutional:    denies fever / chills  Eyes:     denies blurred or double vision  Cardiovascular:  denies chest pain or palpitations  Respiratory:    denies shortness of breath  Gastrointestinal:  denies severe abdominal pain, frequent diarrhea or constipation, nausea / vomiting  Genitourinary:    denies dysuria, bothersome incontinence  Skin/Breast:   denies any breast pain, lumps, or discharge  Neurological:    denies frequent severe headaches  Psychiatric:   denies depression or anxiety, thoughts of harming self or others  Heme/Lymph:    denies easy bruising or bleeding      PHYSICAL EXAM:   Blood pressure 106/69, height 5' 2\" (1.575 m), weight 129 lb (58.5 kg), last menstrual period 08/19/2024, unknown if currently breastfeeding.  Constitutional:  well developed,  well nourished  Head/Face:  normocephalic  Neck/Thyroid: thyroid symmetric, no thyromegaly, no nodules, no adenopathy  Lymphatic: no abnormal supraclavicular or axillary adenopathy is noted  Respiratory:      chest wall symmetric and nontender on palpation, clear to asculation bilateral, no wheezing, rales, ronchi, and resonance normal upon percussion  Cardiovascular: chest normal in appearance, regular rate and rhythm, no murmurs, PMI palpated midclavicular line  Breast:   normal bilaterally without palpable masses, tenderness, asymmetry, nipple discharge, nipple retraction or skin changes bilaterally  Abdomen:   soft, nontender, nondistended, no masses  Skin/Hair:  no unusual rashes or bruises  Extremities:  no edema, no cyanosis, non tender bilaterally  Psychiatric:   oriented to time, place, person and situation. Appropriate mood and affect    Pelvic Exam:  External Genitalia:  normal appearance, hair distribution, and no lesions  Urethral Meatus:   normal in size, location, without lesions   Bladder:    no fullness, masses or tenderness  Vagina:    normal appearance without lesions, no abnormal discharge  Cervix:    No lesions, normal friability   Uterus:    normal in size, 24 wk sized, normal contour, position, mobility, without  motion tenderness  Adnexa:   normal without masses or tenderness  Perineum:   normal  Anus: no hemorroids     Bs ultrasound live iup    ASSESSMENT & PLAN:     [facility] was seen today for prenatal care.    Diagnoses and all orders for this visit:    Previous  delivery affecting pregnancy (HCC)  -     Prenatal Vit-Fe Fumarate-FA (PRENATAL VITAMINS) 28-0.8 MG Oral Tab; Take 1 tablet by mouth daily.  -     Ferrous Sulfate 325 (65 Fe) MG Oral Tab; Take 1 tablet (325 mg total) by mouth every morning before breakfast.  -     CBC W Differential W Platelet; Future  -     Rubella, IGG; Future  -     Antibody Screen; Future  -     T PALLIDUM SCREENING CASCADE; Future  -     Hepatitis B  Surface Antigen; Future  -     Blood Type, ABO And Rh D; Future  -     Urine Culture, Routine; Future  -     HIV AG AB Combo; Future  -     HCV Antibody; Future  -     Glucose Tolerance, 50 gm (1 hr), Gestational (ADA); Future  -     Ferritin; Future  -     Cystic Fibrosis (CF), 97 Variants; Future  -     Spinal Muscular Atrophy (SMA); Future  -     [id number] PLUS+SCA; Future  -     MATERNAL FETAL MEDICINE - INTERNAL  -     ThinPrep Pap with HPV Reflex, Chlamydia/GC; Future  -     Chlamydia/Gc Amplification; Future    Missed menses  -     Cancel: POC Urine pregnancy test [42454]  -     POC Urine pregnancy test [95241]      History and physical exam have been performed.  If you ever need to reach a provider please call the office phone number.  After office hours there is always somebody on call.  Reasons to call the provider discussed with patient.  Prenatal vitamins have been prescribed. The prenatal vitamin has iron and folic acid which helps to prevent iron deficiency anemia and neural tube defects.  Additional iron has been prescribed and should be taken every other day.  Vitamin D supplementation 9977-0914 Iunits daily can be given for pregnant patients whose children are deemed at high risk of asthma. (Patient or her  has asthma).  Vitamin B6 can be prescribed if there is nausea or vomiting and is safe to use up to 3 times daily.  Antacids for reflux are safe.  Tylenol Cold daytime or Tylenol flu daytime are safe to use if there are upper respiratory symptoms.  Extra strength Tylenol can be used for persistent headaches and back pain but in a limited fashion.  Avoidance of nonsteroidals discussed with the patient.  A healthy diet is important and dietary counseling done with the patient..  I counseled that fruits, vegetables, legumes, fish, lean meats, chicken, turkey, nuts and seeds are advised.  Fish to avoid are [place] Mackerel, [place], Orange roughy, Shark, Swordfish, Tilefish, Bigeye tuna. Canned  light tuna (including skipjack) is a good choice.  Please avoid fried and greasy foods.  Please avoid caffeine, alcohol, THC.  I recommend calcium supplementation 500 mg daily and Vitamin D 1,000 mg daily that the patient can obtain over the counter.  Exercise is encouraged and is okay for 30 minutes daily 5 to 7 days/week.  Moderate intensity exercise (able to carry on a normal conversation during exercise) is advised.  Strength training is allowed in a safe manner as to not cause back injury.  Sexual intercourse is allowed if there is no vaginal bleeding . Hot tubs and saunas should be avoided during the first trimester.  Swimming is okay to participate.  The patient should be up-to-date regarding COVID-19 vaccination and the influenza vaccine is recommended during influenza season.   Pregnancy risk factors were reviewed with the patient and prenatal visit frequency and potential timing of future ultrasounds and fetal monitoring if needed were discussed with the patient.  I reviewed the above with the patient and spent approx 32 minutes face to face consultation.   Labs w/ nipt  Level 2 for hx c/s      FOLLOW-UP     No follow-ups on file.      Alvino Lucas MD  2/24/2025         [1] No Known Allergies

## 2025-03-03 NOTE — TELEPHONE ENCOUNTER
Using language line  ID [id number] patient was called to schedule Level 2.  Per  no answer, no voicemail

## 2025-03-11 NOTE — PROGRESS NOTES
Pt name and  verified. Pt called today for RN OB Education.     OB History    Para Term  AB Living   4 2 2 0 1 2   SAB IAB Ectopic Multiple Live Births   0 0 1 0 2      LMP: 24    Pre  BMI: 22.67    EPDS score:     Working HERMAN: 25  Hx of genetic abnormality in family: denies  Hx of varicella: undecided     Consent (if needed): repeat c/s    Sterilization/Contraception: tubal ligation    OUD Screening: Pt. Has answered NO 5P questions and has NO  risk factors.    Pt. Given What pregnant women need to know handout.       SDOH Screening: low risk    Educational material reviewed with patient: Prenatal care, nutrition, weight gain recommendations, travel, exercise, intercourse, pregnancy changes, safe medications, pregnancy and work, fetal movement, labor and  labor, warning signs, food safety, tdap, cord blood, breastfeeding- both, circumcision- yes, and Group B strep.     Blood transfusion if needed: consents    PN labs: ordered    Iron Supplementation (325 mg every other day): taking  Vitamin D (2,000 IUs daily): sent Rx  Calcium (1 gram Daily): sent Rx    Optional genetic screening labs were reviewed: Cell FreeDNA, FTS with US, Quad screen MSAFP and CF screening. MFM scheduled for     [facility] Policy: discussed      NOB apt:  3/27 with Dr. Patten

## 2025-03-27 NOTE — PROGRESS NOTES
Kick Counts reviewed. Size < dates - growth scan pending   O +  Tdap given today - benefits reviewed.   Lab Results   Component Value Date    GLU1OB 101 03/03/2025     Immunization History   Administered Date(s) Administered    Covid-19 Vaccine Pfizer 30 mcg/0.3 ml 10/22/2021    FLULAVAL 6 months & older 0.5 ml Prefilled syringe (07269) 10/07/2022    FLUZONE 6 months and older PFS 0.5 ml (68498) 11/20/2021    TDAP 04/13/2022, 03/27/2025     Future Appointments   Date Time Provider Department Center   [facility] [place]

## 2025-04-09 NOTE — PROGRESS NOTES
Reason for Consult:   Dear Dr. Lucas,    Thank you for requesting ultrasound evaluation and maternal fetal medicine consultation on [facility] Doe Ramonaradha Mccarthy.  As you are aware she is a 29 year old female with a Gibbs pregnancy .  A maternal-fetal medicine consultation was requested secondary to  h/o postpartum hemorrhage and size/dates.  Her prenatal records and labs were reviewed.    Review of History:     OB History:    OB History    Para Term  AB Living   4 2 2 0 1 2   SAB IAB Ectopic Multiple Live Births   0 0 1 0 2      # Outcome Date GA Lbr Scott/2nd Weight Sex Type Anes PTL Lv   4 Current            3 Term 22 39w0d  6 lb 13.7 oz (3.11 kg) M Caesarean Spinal N LEBRON      Name: KRISTINE MCCARTHY,BOY      Apgar1: 9  Apgar5: 9   2 Ectopic 21 4w0d             Birth Comments: treated with medication   1 Term 17 38w0d  7 lb 0.9 oz (3.2 kg) F Caesarean EPI  LEBRON      Complications: Fetal Intolerance             Allergies:  Allergies[1]   Current Meds:  Current Medications[2]     HISTORY:  Past Medical History[3]   Past Surgical History[4]   Family History[5]   Short Social Hx on File[6]     NARRATIVE:     /73   Pulse 92   Wt 136 lb (61.7 kg)   LMP 2024   BMI 24.87 kg/m²           Alert and Oriented.  No acute distress          Abdomen:  soft, nontender, no contractions noted.           extremities:  nontender, no edema    DISCUSSION  During her visit we discussed and reviewed the following issues:         Postpartum hemorrhage, the loss of more than 500 mL of blood after delivery, occurs in up to 18 percent of births and is the most common maternal morbidity in developed countries. Although risk factors and preventive strategies are clearly documented, not all cases are expected or avoidable. Uterine atony is responsible for most cases and can be managed with uterine massage in conjunction with oxytocin, prostaglandins, and ergot alkaloids. Retained placenta  is a less common cause and requires examination of the placenta, exploration of the uterine cavity, and manual removal of retained tissue. Rarely, an invasive placenta causes postpartum hemorrhage and may require surgical management. Traumatic causes include lacerations, uterine rupture, and uterine inversion. Coagulopathies require clotting factor replacement for the identified deficiency. Early recognition, systematic evaluation and treatment, and prompt fluid resuscitation minimize the potentially serious outcomes associated with postpartum hemorrhage.     Complications from postpartum hemorrhage include orthostatic hypotension, anemia, and fatigue, which may make maternal care of the  more difficult. Post-partum anemia increases the risk of post-partum depression.  Blood transfusion may be necessary and carries associated risks.  In the most severe cases, hemorrhagic shock may lead to anterior pituitary ischemia with delay or failure of lactation (i.e., postpartum pituitary necrosis).  Occult myocardial ischemia, dilutional coagulopathy, and death also may occur.  Delayed postpartum hemorrhage, bleeding after 24 hours as a result of sloughing of the placental eschar or retained placental fragments, also can occur.    The best preventive strategy is active management of the third stage of labor.  Hospital guidelines encouraging this practice have resulted in significant reductions in the incidence of massive hemorrhage.  Active management, which involves administering a uterotonic drug with or soon after the delivery of the anterior shoulder, controlled cord traction, and, usually, early cord clamping and cutting, decreases the risk of postpartum hemorrhage and shortens the third stage of labor with no significant increase in the risk of retained placenta.  Compared with expectant management, in which the placenta is allowed to separate spontaneously aided only by gravity or nipple stimulation, active  management decreases the incidence of postpartum hemorrhage by 68 percent.         OB ULTRASOUND REPORT   See imaging tab for complete ultrasound report or in PACS    Single IUP in cephalic presentation.    Placenta is posterior.   A 3 vessel cord, 3 vessel cord is noted.  Cardiac activity is present at 138 bpm  EFW 1806 g ( 4 lb 0 oz);   MVP is 5.5 cm .         Fetal Anatomy:  Visualized with normal appearance: head, face, spine, neck, skin, chest, abdominal wall, gastrointestinal tract, kidneys, bladder, extremities.   Brain: Visualized and normal appearances: brain parenchyma, cerebral ventricles, choroid plexus, Cisterna Magna, midline falx, cerebellum, cerebellar lobes, posterior fossa, vermis, cavum septi pellucidi.  Face: eyes normal, profile normal, nose normal, lip normal, palate normal.  Heart: visualized and normal appearance: 3 vessel view, four-chamber, left outflow tract, right outflow tract, arches.      Summary of Ultrasound findings:  This is a Gibbs pregnancy    The fetal measurements are consistent with established EDC. No gross ultrasound evidence of structural abnormalities are seen today. The patient understands that ultrasound cannot rule out all structural and chromosomal abnormalities.      used.  IMPRESSION:   1. IUP @  30w6d  2. Scan consistent with dates  3. No fetal structural abnormalities seen  4. H/o postpartum hemorrhage    RECOMMENDATIONS:   1.  Fetal movement counts    Thank you for allowing me to participate in the care of your patient.  Please do not hesitate to call with any questions or concerns.     Total time spent   40  minutes this calendar day which includes preparing to see the patient including chart review, obtaining and/or reviewing additional medical history, performing a physical exam and evaluation, documenting clinical information in the electronic medical record, independently interpreting results, counseling the patient, communicating  results to the patient/family/caregiver and coordinating care.    Shane Cameron D.O.  Maternal Fetal Medicine     Note to patient and family:  The  Century Cures Act makes medical notes available to patients in the interest of transparency.  However, please be advised that this is a medical document.  It is intended as a peer to peer communication.  It is written in medical language and may contain abbreviations or verbiage that are technical and unfamiliar.  It may appear blunt or direct.  Medical documents are intended to carry relevant information, facts as evident, and the clinical opinion of the practitioner.         [1] No Known Allergies  [2]   Current Outpatient Medications   Medication Sig Dispense Refill    Fe Cbn-Fe Gluc-FA-B12-C-DSS (FERRALET 90) 90-1 MG Oral Tab Take 1 tablet by mouth every other day. 15 tablet 9    Cholecalciferol (VITAMIN D) 50 MCG (2000 UT) Oral Tab Take 50 mcg by mouth daily. 30 tablet 3    Calcium 500 MG Oral Tab Take 1,000 mg by mouth daily. (Patient not taking: Reported on 3/27/2025) 30 tablet 3    Prenatal Vit-Fe Fumarate-FA (PRENATAL VITAMINS) 28-0.8 MG Oral Tab Take 1 tablet by mouth daily. 90 tablet 2    Ferrous Sulfate 325 (65 Fe) MG Oral Tab Take 1 tablet (325 mg total) by mouth every morning before breakfast. (Patient not taking: Reported on 3/27/2025) 90 tablet 3    fluconazole (DIFLUCAN) 150 MG Oral Tab Take 1 tablet (150 mg total) by mouth every 3 (three) days. (Patient not taking: Reported on 3/27/2025) 2 tablet 0   [3]   Past Medical History:   Chicken pox    Childhood    Depression    No therapy or meds   [4]   Past Surgical History:  Procedure Laterality Date          2017     delivery only         [5]   Family History  Problem Relation Age of Onset    Diabetes Father     No Known Problems Mother     No Known Problems Maternal Grandmother     No Known Problems Maternal Grandfather     No Known Problems Paternal Grandmother     No Known  Problems Paternal Grandfather    [6]   Social History  Socioeconomic History    Marital status:      Spouse name: Arnulfo Jaramillo    Number of children: 1    Years of education: 9    Highest education level: 9th grade   Occupational History    Occupation: Homemaker   Tobacco Use    Smoking status: Never     Passive exposure: Never    Smokeless tobacco: Never   Vaping Use    Vaping status: Never Used   Substance and Sexual Activity    Alcohol use: Never    Drug use: Never   Other Topics Concern    Blood Transfusions No    Caffeine Concern No    Occupational Exposure No    Special Diet No    Exercise Yes     Comment: walking     Seat Belt Yes     Social Drivers of Health     Food Insecurity: No Food Insecurity (2/24/2025)    NCSS - Food Insecurity     Worried About Running Out of Food in the Last Year: No     Ran Out of Food in the Last Year: No   Transportation Needs: No Transportation Needs (2/24/2025)    NCSS - Transportation     Lack of Transportation: No   Housing Stability: Not At Risk (2/24/2025)    NCSS - Housing/Utilities     Has Housing: Yes     Worried About Losing Housing: No     Unable to Get Utilities: No

## 2025-04-28 NOTE — TELEPHONE ENCOUNTER
DATE: 39 weeks -  with Herrera   INDICATION FOR C/S:  repeat c/s   TUBAL LIGATION: no   SPECIAL REQUEST: none   EDC:  Estimated Date of Delivery: 25      Lady Patten MD

## 2025-04-28 NOTE — PROGRESS NOTES
Affirm done for vaginal itching    Kick Counts and Labor precautions reviewed.  O+     Immunization History   Administered Date(s) Administered    Covid-19 Vaccine Pfizer 30 mcg/0.3 ml 10/22/2021    FLULAVAL 6 months & older 0.5 ml Prefilled syringe (88015) 10/07/2022    FLUZONE 6 months and older PFS 0.5 ml (19657) 11/20/2021    TDAP 04/13/2022, 03/27/2025      Future Appointments   Date Time Provider Department Center   5/8/2025  4:00 PM Alvino Lucas MD [facility]   5/19/2025  9:15 AM Alvino Lucas MD [facility]         1. IUP @  30w6d  2. Scan consistent with dates  3. No fetal structural abnormalities seen  4. H/o postpartum hemorrhage     RECOMMENDATIONS:   1.  Fetal movement counts

## 2025-04-30 NOTE — TELEPHONE ENCOUNTER
----- Message from Lady Patten sent at 4/30/2025  3:09 PM CDT -----    Please inform the patient of positive yeast results.  Give Rx Diflucan 150mg po x 1, unless in the first trimester then have patient take otc monistat 7 day.     Lady Patten MD    ----- Message -----  From: Lab, Background User  Sent: 4/29/2025  11:44 AM CDT  To: Lady Patten MD

## 2025-05-08 NOTE — TELEPHONE ENCOUNTER
I spoke with the patient, confirmed day and time for her  using the language line     I left a message with Dr. JUAREZ for the assist

## 2025-05-08 NOTE — PROGRESS NOTES
Davon Fernandez is a 29 year old female  Patient's last menstrual period was 2024 (approximate).   Chief Complaint   Patient presents with    Prenatal Care     Pt here for prenatal visit       OBSTETRICS HISTORY:     OB History    Para Term  AB Living   4 2 2 0 1 2   SAB IAB Ectopic Multiple Live Births   0 0 1 0 2      # Outcome Date GA Lbr Scott/2nd Weight Sex Type Anes PTL Lv   4 Current            3 Term 22 39w0d  6 lb 13.7 oz (3.11 kg) M Caesarean Spinal N LEBRON   2 Ectopic 21 4w0d             Birth Comments: treated with medication   1 Term 17 38w0d  7 lb 0.9 oz (3.2 kg) F Caesarean EPI  LEBRON      Complications: Fetal Intolerance       GYNE HISTORY:         Menarche: 12 years (3/11/2025  9:10 AM)  Period Cycle (Days): irregular (3/11/2025  9:10 AM)  Period Duration (Days): 2 days (3/11/2025  9:10 AM)  Period Flow: light (3/11/2025  9:10 AM)  Use of Birth Control (if yes, specify type): None (3/11/2025  9:10 AM)  Hx Prior Abnormal Pap: No (3/11/2025  9:10 AM)  Pap Date: 10/07/22 (3/11/2025  9:10 AM)  Pap Result Notes: WNL (3/11/2025  9:10 AM)        Latest Ref Rng & Units 2025     1:28 PM 10/7/2022     2:31 PM   RECENT PAP RESULTS   INTERPRETATION/RESULT: Negative for intraepithelial lesion or malignancy Negative for intraepithelial lesion or malignancy  Negative for intraepithelial lesion or malignancy    HPV Negative Negative           MEDICAL HISTORY:     Past Medical History[1]    SURGICAL HISTORY:     Past Surgical History[2]    SOCIAL HISTORY:     Short Social Hx on File[3]     FAMILY HISTORY:     Family History[4]    MEDICATIONS:     Medications - Current[5]    ALLERGIES:     Allergies[6]      REVIEW OF SYSTEMS:     Constitutional:    denies fever / chills  Cardiovascular:  denies chest pain or palpitations  Respiratory:    denies shortness of breath  Gastrointestinal:  denies severe abdominal pain, frequent diarrhea or constipation, nausea  / vomiting  Genitourinary:    denies dysuria, bothersome incontinence  Skin/Breast:   denies any breast pain, lumps, or discharge  Neurological:    denies frequent severe headaches  Psychiatric:   denies depression or anxiety, thoughts of harming self or others      PHYSICAL EXAM:   Blood pressure 103/69, pulse 81, weight 141 lb (64 kg), last menstrual period 2024, unknown if currently breastfeeding.  Constitutional:  well developed, well nourished, no distress  Abdomen:   soft, gravid, nontender  Musculoskeletal: no cva tenderness bilaterally  Skin/Hair:  no unusual rashes or bruises  Extremities:  no edema, no cyanosis, non tender bilaterally  Psychiatric:   oriented to time, place, person and situation. Appropriate mood and affect      ASSESSMENT & PLAN:     Davon was seen today for prenatal care.    Diagnoses and all orders for this visit:    Previous  delivery affecting pregnancy (HCC)  -     HIV Ag/Ab Combo; Future  -     T Pallidum Screening [place]; Future  -     CBC, Platelet; No Differential; Future  -     Ferritin; Future      Fetal kick counts discussed with  patient, labor precautions given.  3rd tri labs  On  pt states she requested tubal. Permanent d/w pt at that time and she confirmed wants only 3 children.       FOLLOW-UP     No follow-ups on file.      Alvino Lucas MD  2025         [1]   Past Medical History:   Chicken pox    Childhood    Depression    No therapy or meds   [2]   Past Surgical History:  Procedure Laterality Date          2017     delivery only         [3]   Social History  Socioeconomic History    Marital status:      Spouse name: Arnulfo Jaramillo    Number of children: 1    Years of education: 9    Highest education level: 9th grade   Occupational History    Occupation: Homemaker   Tobacco Use    Smoking status: Never     Passive exposure: Never    Smokeless tobacco: Never   Vaping Use    Vaping status: Never Used    Substance and Sexual Activity    Alcohol use: Never    Drug use: Never   Other Topics Concern    Blood Transfusions No    Caffeine Concern No    Occupational Exposure No    Special Diet No    Exercise Yes     Comment: walking     Seat Belt Yes     Social Drivers of Health     Food Insecurity: No Food Insecurity (2/24/2025)    NCSS - Food Insecurity     Worried About Running Out of Food in the Last Year: No     Ran Out of Food in the Last Year: No   Transportation Needs: No Transportation Needs (2/24/2025)    NCSS - Transportation     Lack of Transportation: No   Housing Stability: Not At Risk (2/24/2025)    NCSS - Housing/Utilities     Has Housing: Yes     Worried About Losing Housing: No     Unable to Get Utilities: No   [4]   Family History  Problem Relation Age of Onset    Diabetes Father     No Known Problems Mother     No Known Problems Maternal Grandmother     No Known Problems Maternal Grandfather     No Known Problems Paternal Grandmother     No Known Problems Paternal Grandfather    [5]   Current Outpatient Medications:     Cholecalciferol (VITAMIN D) 50 MCG (2000 UT) Oral Tab, Take 50 mcg by mouth daily., Disp: 30 tablet, Rfl: 3    Prenatal Vit-Fe Fumarate-FA (PRENATAL VITAMINS) 28-0.8 MG Oral Tab, Take 1 tablet by mouth daily., Disp: 90 tablet, Rfl: 2  [6] No Known Allergies

## 2025-05-20 NOTE — PROGRESS NOTES
[facility] Doe Dietrich is a  at 36w5d here for VLADISLAV visit. Normal fetal movement. No VB/LOF. No regular/painful cramping/ctx concerning for labor. Just having intermittent contractions and pelvic pressure. GBS collected today. Reinforced need for repeat labs, stefani CBC and ferritin today. Currently not taking PO Fe; Rx sent. Has not picked pediatrician. Needs breast pump, would like to try breastfeeding. Has CS scheduled for . BV swab collected d/t discharge and previous positive with ongoing mild contractions. Try vaginal treatment if positive as refractory to Diflucan.      Inna, ID: [id number]

## 2025-05-21 NOTE — TELEPHONE ENCOUNTER
----- Message from Zelda Sanchez sent at 5/21/2025  4:26 PM CDT -----  Please call and offer diflucan 150 mg x1 or 7d terconazole  ----- Message -----  From: Lab, Background User  Sent: 5/21/2025  11:04 AM CDT  To: Zelda Sanchez MD

## 2025-05-28 NOTE — OPERATIVE REPORT
[facility]  part of [facility]     Section Delivery / Operative Note    [facility] Doe Dietrich Patient Status:  Inpatient    6/10/1995 MRN [id number]   Location [facility] Attending Kimber Herrera MD   Hosp Day # 0 PCP None Pcp     Pre Op Diagnosis:  IUP at 37w6d, , 37.6, Repeat C/S with bilateral salpingectomy    Post Op Diagnosis:  same as pre-op, Same - Delivered    Procedure:  repeat  LTCS and Bilateral Salpingectomy     Indications:  Patient is a 29 year old year old  at 37w6d who presented for labor,declined tolac. The risks, benefits and alternatives were d/w patient including but not limited to risk of injury, infection, bleeding and subsequent  section deliveries. All questions and concerns were addressed. Patient provided verbal and written consent.     Surgeon:  Kimber Herrera MD    Assistant Surgeon:  Dr. Rivera     Anesthesia: spinal     Complications: none     Antibiotics:  Ancef 2 grams preoperatively    QBL: 286cc    Specimens: Placenta, cord gases and cord blood    Findings: normal uterus, normal tubes bilaterally, normal ovaries bilaterally. Live male infant, Nuchal Cord:  none  clear amniotic fluid noted.    Infant:  Date of Delivery: 2025   Time of Delivery: 2:04 PM  Delivery Type: Caesarean Section    Infant Sex  Information for the patient's :  Alexi Monson [[id number]]   male  Infant Birthweight  Information for the patient's :  Alexi Monson [[id number]]   6 lb 8.8 oz (2.97 kg)     Apgars:  1 minute: 9               5 minutes: 9               Placenta  Delivery: spontaneous  Placenta to Pathology: no    Cord:  Cord Gases Submitted: yes  Cord Blood/Tissue Collection: yes    Procedure:   After informed consent was obtained, the patient was taken to the operating room, prepped and draped in the usual sterile fashion. SCDs and a mak catheter were placed and  anesthesia was found to be adequate. Two grams of ancef  were given for infection prophylaxis. She was prepared and draped in the dorsal supine position with a leftward tilt. A time out was performed. A pfannenstiel skin incision was made and carried down to the fascia. The fascia was incised and extended laterally with Morris scissors. The superior aspect of the fascia was grasped with kocher clamps, and the rectus muscle was dissected off bluntly then sharply with morris scissors. In a similar fashion, the inferior aspect of the fascia was grasped with kocher clamps and the underlying rectus muscle was dissected off bluntly and then sharply with Morris scissors. Hemostasis was achieved with the bovie. The rectus muscle was  in the midline down to the level of the pubic symphysis. The peritoneum was entered bluntly and extended laterally. There was good visualization of the bladder.     The Conrad retractor was inserted and the vesicouterine peritoneum identified. The lower uterine segment was incised with a scalpel. The amniotic sac was ruptured and clear fluid noted. The incision was extended bluntly with superior and inferior traction.     The fetus was in cephalic presentation. The head was elevated out of the pelvis to the the hysterotomy site. Gentle fundal pressure was applied and the infant was delivered without difficulty. Delayed cord clamping for 30 seconds. The cord was clamped and cut. The infant was handed off to the pediatrician. IV oxytocin was initiated to facilitate uterine contractions. IM methergine was also given prophylactically due to hx of PPH. The placenta was delivered intact with manual massage of the uterine fundus. The inside of the uterus was wiped with a lap sponge to assure complete removal of placenta membranes. The uterus was exteriorized. The uterine incision was closed with a 0-vicryl suture in a running locked fashion. A second imbricating stitch with 0- monocryl was placed  for added hemostasis.The uterus, tubes, and ovaries were normal appearing. At this time a bilateral salpingectomy was performed with the Ligasure in standard fashion. Good hemostasis noted and the uterus was returned to the abdomen. Re-inspection of the hysterotomy, peritomeum and rectus muscles was done and the bladder flap was bleeding. A figure of eight with 0 monocryl was placed and hemostasis achieved. Surgiceal powder was placed and excellent hemostasis was noted. The Conrad retractor was removed.     The fascia was closed with 0-vicryl suture in a running fashion. The subcutaneous tissue was copiously irrigated and any bleeding cauterized. The subcutaneous tissue was closed with 2-0 plain catgut suture. The skin was repaired with 4-0 monocryl. Dermabond was placed on the skin. Tegaderm was placed over the skin. The uterus was expressed for excess clots and excellent hemostasis was noted.    All sponge, instrument and needle counts were correct x3. The patient toelrated the procedure well and was taken to the recovery room in a stable and satisfactory condition. The baby was in stable condition to the recovery room.     Specimen:  none    Drains: mak to dependant drainage    Condition:   stable    Complications: None; patient tolerated the procedure well.      Kimbre Herrera MD  5/28/2025  3:14 PM

## 2025-05-28 NOTE — PROGRESS NOTES
Patient transferred to mother/baby room 359 per cart in stable condition with baby and personal belongings.  Accompanied by  and staff.  Report given to mother/baby RN Adelfo.

## 2025-05-28 NOTE — ANESTHESIA PREPROCEDURE EVALUATION
Anesthesia PreOp Note    HPI:     Davon Fernandez is a 29 year old female who presents for preoperative consultation requested by: Kimber Herrera MD    Date of Surgery: 2025    Procedure(s):   SECTION  BILATERAL SALPINGECTOMY  Indication: Repeat HERMAN    Surgival Tech, Barrett will be the assist    Relevant Problems   No relevant active problems       NPO:                         History Review:  Patient Active Problem List    Diagnosis Date Noted    Pregnancy (HCC) 2025    Delayed postpartum hemorrhage (HCC) 2022    Previous  delivery affecting pregnancy (HCC) 10/22/2021       Past Medical History[1]    Past Surgical History[2]    Prescriptions Prior to Admission[3]  Current Medications and Prescriptions Ordered in Epic[4]    Allergies[5]    Family History[6]  Social Hx on file[7]    Available pre-op labs reviewed.  Lab Results   Component Value Date    WBC 9.7 2025    RBC 3.87 2025    HGB 10.7 (L) 2025    HCT 34.3 (L) 2025    MCV 88.6 2025    MCH 27.6 2025    MCHC 31.2 2025    RDW 15.8 (H) 2025    .0 2025             Vital Signs:  There is no height or weight on file to calculate BMI.   oral temperature is 98.7 °F (37.1 °C). Her blood pressure is 119/82 and her pulse is 72. Her respiration is 17.   Vitals:    25 1045   BP: 119/82   Pulse: 72   Resp: 17   Temp: 98.7 °F (37.1 °C)   TempSrc: Oral        Anesthesia Evaluation     Patient summary reviewed and Nursing notes reviewed    Airway   Mallampati: I  TM distance: >3 FB  Neck ROM: full  Dental - Dentition appears grossly intact     Pulmonary - negative ROS and normal exam   Cardiovascular - negative ROS and normal exam    Neuro/Psych    (+)   depression      GI/Hepatic/Renal - negative ROS     Endo/Other - negative ROS   Abdominal  - normal exam                 Anesthesia Plan:   ASA:  2  Plan:   Spinal  Post-op Pain Management:  Intrathecal narcotics and IV analgesics  Plan Comments: Possible general discussed  Informed Consent Plan and Risks Discussed With:  Patient and   Use of Blood Products Discussed With:  Patient and       I have informed Davon Fernandez and/or legal guardian or family member of the nature of the anesthetic plan, benefits, risks including possible dental damage if relevant, major complications, and any alternative forms of anesthetic management.   All of the patient's questions were answered to the best of my ability. The patient desires the anesthetic management as planned.  NOÉ ESTRELLA MD  2025 12:56 PM  Present on Admission:  **None**           [1]   Past Medical History:   Chicken pox    Childhood    Depression    No therapy or meds   [2]   Past Surgical History:  Procedure Laterality Date               delivery only         [3]   Medications Prior to Admission   Medication Sig Dispense Refill Last Dose/Taking    Ferrous Sulfate 325 (65 Fe) MG Oral Tab Take 1 tablet (325 mg total) by mouth daily with breakfast. 30 tablet 3 2025 Evening    Cholecalciferol (VITAMIN D) 50 MCG (2000 UT) Oral Tab Take 50 mcg by mouth daily. 30 tablet 3 2025 Evening    [] fluconazole (DIFLUCAN) 150 MG Oral Tab Take 1 tablet (150 mg total) by mouth once for 1 dose. 1 tablet 0     BREAST PUMP Does not apply Misc Please supply patient with Double electric breast pump covered by insurance HERMAN (25) 1 each 0     [] fluconazole (DIFLUCAN) 150 MG Oral Tab Take 1 tablet (150 mg total) by mouth once for 1 dose. 1 tablet 0     [] Fe Cbn-Fe Gluc-FA-B12-C-DSS (FERRALET 90) 90-1 MG Oral Tab Take 1 tablet by mouth every other day. 15 tablet 9     [] Calcium 500 MG Oral Tab Take 1,000 mg by mouth daily. (Patient not taking: Reported on 3/27/2025) 30 tablet 3     [] Prenatal Vit-Fe Fumarate-FA (PRENATAL VITAMINS) 28-0.8 MG Oral  Tab Take 1 tablet by mouth daily. 90 tablet 2    [4]   Current Facility-Administered Medications Ordered in Epic   Medication Dose Route Frequency Provider Last Rate Last Admin    lactated ringers infusion  125 mL/hr Intravenous Continuous Kimber Herrera  mL/hr at 05/28/25 1222 125 mL/hr at 05/28/25 1222    acetaminophen (Tylenol Extra Strength) tab 1,000 mg  1,000 mg Oral Once Kimber Herrera MD        ondansetron (Zofran) 4 MG/2ML injection 4 mg  4 mg Intravenous Q6H PRN Kimber Herrera MD        sodium citrate-citric acid (Bicitra) 500-334 MG/5ML oral solution 30 mL  30 mL Oral Once Kimber Herrera MD        oxyTOCIN in sodium chloride 0.9% (Pitocin) 30 Units/500mL infusion premix  62.5-900 prema-units/min Intravenous Continuous Kimber Herrera MD        famotidine (Pepcid) tab 20 mg  20 mg Oral Once Kimber Herrera MD        Or    famotidine (Pepcid) 20 mg/2mL injection 20 mg  20 mg Intravenous Once Kimber Herrera MD        metoclopramide (Reglan) tab 10 mg  10 mg Oral Once Kimber Herrera MD        Or    metoclopramide (Reglan) 5 mg/mL injection 10 mg  10 mg Intravenous Once Kimber Herrera MD        ceFAZolin (Ancef) 2g in 10mL IV syringe premix  2 g Intravenous Once Kimber Herrera MD        azithromycin (Zithromax) 500 mg in sodium chloride 0.9% 250mL IVPB premix  500 mg Intravenous Once Kimber Herrera MD         No current [facility]-ordered outpatient medications on file.   [5] No Known Allergies  [6]   Family History  Problem Relation Age of Onset    Diabetes Father     No Known Problems Mother     No Known Problems Maternal Grandmother     No Known Problems Maternal Grandfather     No Known Problems Paternal Grandmother     No Known Problems Paternal Grandfather    [7]   Social History  Socioeconomic History    Marital status:      Spouse name: Arnulfo Jaramillo    Number of children: 1    Years of education: 9    Highest education level: 9th grade    Occupational History    Occupation: Homemaker   Tobacco Use    Smoking status: Never     Passive exposure: Never    Smokeless tobacco: Never   Vaping Use    Vaping status: Never Used   Substance and Sexual Activity    Alcohol use: Never    Drug use: Never   Other Topics Concern    Blood Transfusions No    Caffeine Concern No    Occupational Exposure No    Special Diet No    Exercise Yes     Comment: walking     Seat Belt Yes

## 2025-05-28 NOTE — PLAN OF CARE
Problem: GASTROINTESTINAL - ADULT  Goal: Minimal or absence of nausea and vomiting  Description: INTERVENTIONS:  - Maintain adequate hydration with IV or PO as ordered and tolerated  - Nasogastric tube to low intermittent suction as ordered  - Evaluate effectiveness of ordered antiemetic medications  - Provide nonpharmacologic comfort measures as appropriate  - Advance diet as tolerated, if ordered  - Obtain nutritional consult as needed  - Evaluate fluid balance  Outcome: Progressing  Goal: Maintains or returns to baseline bowel function  Description: INTERVENTIONS:  - Assess bowel function  - Maintain adequate hydration with IV or PO as ordered and tolerated  - Evaluate effectiveness of GI medications  - Encourage mobilization and activity  - Obtain nutritional consult as needed  - Establish a toileting routine/schedule  - Consider collaborating with pharmacy to review patient's medication profile  Outcome: Progressing  Goal: Maintains adequate nutritional intake (undernourished)  Description: INTERVENTIONS:  - Monitor percentage of each meal consumed  - Identify factors contributing to decreased intake, treat as appropriate  - Assist with meals as needed  - Monitor I&O, WT and lab values  - Obtain nutritional consult as needed  - Optimize oral hygiene and moisture  - Encourage food from home; allow for food preferences  - Enhance eating environment  Outcome: Completed  Goal: Achieves appropriate nutritional intake (bariatric)  Description: INTERVENTIONS:  - Monitor for over-consumption  - Identify factors contributing to increased intake, treat as appropriate  - Monitor I&O, WT and lab values  - Obtain nutritional consult as needed  - Evaluate psychosocial factors contributing to over-consumption  Outcome: Completed     Problem: GENITOURINARY - ADULT  Goal: Absence of urinary retention  Description: INTERVENTIONS:  - Assess patient’s ability to void and empty bladder  - Monitor intake/output and perform bladder  scan as needed  - Follow urinary retention protocol/standard of care  - Consider collaborating with pharmacy to review patient's medication profile  - Implement strategies to promote bladder emptying  Outcome: Progressing     Problem: POSTPARTUM  Goal: Long Term Goal:Experiences normal postpartum course  Description: INTERVENTIONS:  - Assess and monitor vital signs and lab values.  - Assess fundus and lochia.  - Provide ice/sitz baths for perineum discomfort.  - Monitor healing of incision/episiotomy/laceration, and assess for signs and symptoms of infection and hematoma.  - Assess bladder function and monitor for bladder distention.  - Provide/instruct/assist with pericare as needed.  - Provide VTE prophylaxis as needed.  - Monitor bowel function.  - Encourage ambulation and provide assistance as needed.  - Assess and monitor emotional status and provide social service/psych resources as needed.  - Utilize standard precautions and use personal protective equipment as indicated. Ensure aseptic care of all intravenous lines and invasive tubes/drains.  - Obtain immunization and exposure to communicable diseases history.  Outcome: Progressing  Goal: Optimize infant feeding at the breast  Description: INTERVENTIONS:  - Initiate breast feeding within first hour after birth.   - Monitor effectiveness of current breast feeding efforts.  - Assess support systems available to mother/family.  - Identify cultural beliefs/practices regarding lactation, letdown techniques, maternal food preferences.  - Assess mother's knowledge and previous experience with breast feeding.  - Provide information as needed about early infant feeding cues (e.g., rooting, lip smacking, sucking fingers/hand) versus late cue of crying.  - Discuss/demonstrate breast feeding aids (e.g., infant sling, nursing footstool/pillows, and breast pumps).  - Encourage mother/other family members to express feelings/concerns, and actively listen.  - Educate  father/SO about benefits of breast feeding and how to manage common lactation challenges.  - Recommend avoidance of specific medications or substances incompatible with breast feeding.  - Assess and monitor for signs of nipple pain/trauma.  - Instruct and provide assistance with proper latch.  - Review techniques for milk expression (breast pumping) and storage of breast milk. Provide pumping equipment/supplies, instructions and assistance, as needed.  - Encourage rooming-in and breast feeding on demand.  - Encourage skin-to-skin contact.  - Provide LC support as needed.  - Assess for and manage engorgement.  - Provide breast feeding education handouts and information on community breast feeding support.   Outcome: Progressing  Goal: Establishment of adequate milk supply with medication/procedure interruptions  Description: INTERVENTIONS:  - Review techniques for milk expression (breast pumping).   - Provide pumping equipment/supplies, instructions, and assistance until it is safe to breastfeed infant.  Outcome: Progressing  Goal: Appropriate maternal -  bonding  Description: INTERVENTIONS:  - Assess caregiver- interactions.  - Assess caregiver's emotional status and coping mechanisms.  - Encourage caregiver to participate in  daily care.  - Assess support systems available to mother/family.  - Provide /case management support as needed.  Outcome: Progressing

## 2025-05-28 NOTE — ANESTHESIA PROCEDURE NOTES
Spinal Block    Date/Time: 5/28/2025 1:50 PM    Performed by: Anant Bradford MD  Authorized by: Anant Bradford MD      General Information and Staff    Start Time:  5/28/2025 1:50 PM  End Time:  5/28/2025 1:52 PM  Anesthesiologist:  Anant Bradford MD  Performed by:  Anesthesiologist  Patient Location:  OB  Site identification: surface landmarks    Preanesthetic Checklist: patient identified, IV checked, risks and benefits discussed, monitors and equipment checked, pre-op evaluation, timeout performed, anesthesia consent and sterile technique used      Procedure Details    Patient Position:  Sitting  Prep: ChloraPrep    Monitoring:  Cardiac monitor  Approach:  Midline  Location:  L3-4  Injection Technique:  Single-shot    Needle    Needle Type:  Pencil-tip  Needle Gauge:  24 G  Needle Length:  3.5 in    Assessment    Sensory Level:   Events: clear CSF, CSF aspirated, well tolerated and blood negative      Additional Comments     Patient tolerated spinal placement well with no complications noted.

## 2025-05-28 NOTE — ED PROVIDER NOTES
Patient Seen in: [facility]        History  Chief Complaint   Patient presents with    Abdominal Pain     Stated Complaint: -pregnant experiencing cramping    Subjective:   Davon is a 29-year-old female presenting to the immediate care with lower abdominal cramping and pelvic pressure.  Patient is G3, P2 who has had 2 previous C-sections.  Patient is currently 37 weeks pregnant and scheduled for  on .  Patient has no other complaints or concerns.          Objective:     Past Medical History:    Chicken pox    Childhood    Depression    No therapy or meds              Past Surgical History:   Procedure Laterality Date          2017     delivery only                      Social History     Socioeconomic History    Marital status:      Spouse name: Arnulfo Jaramillo    Number of children: 1    Years of education: 9    Highest education level: 9th grade   Occupational History    Occupation: Homemaker   Tobacco Use    Smoking status: Never     Passive exposure: Never    Smokeless tobacco: Never   Vaping Use    Vaping status: Never Used   Substance and Sexual Activity    Alcohol use: Never    Drug use: Never   Other Topics Concern    Blood Transfusions No    Caffeine Concern No    Occupational Exposure No    Special Diet No    Exercise Yes     Comment: walking     Seat Belt Yes     Social Drivers of Health     Food Insecurity: No Food Insecurity (2025)    NCSS - Food Insecurity     Worried About Running Out of Food in the Last Year: No     Ran Out of Food in the Last Year: No   Transportation Needs: No Transportation Needs (2025)    NCSS - Transportation     Lack of Transportation: No   Housing Stability: Not At Risk (2025)    NCSS - Housing/Utilities     Has Housing: Yes     Worried About Losing Housing: No     Unable to Get Utilities: No              Review of Systems    Positive for stated complaint: -pregnant experiencing cramping  Other  systems are as noted in HPI.  Constitutional and vital signs reviewed.      All other systems reviewed and negative except as noted above.                  Physical Exam    ED Triage Vitals [25 0930]   BP (!) 124/98   Pulse 95   Resp 18   Temp 97.5 °F (36.4 °C)   Temp src Oral   SpO2 100 %   O2 Device None (Room air)       Current Vitals:   Vital Signs  BP: (!) 124/98  Pulse: 95  Resp: 18  Temp: 97.5 °F (36.4 °C)  Temp src: Oral    Oxygen Therapy  SpO2: 100 %  O2 Device: None (Room air)            Physical Exam  Vitals and nursing note reviewed. Exam conducted with a chaperone present (Lilia Bartholomew MA).   Constitutional:       General: She is not in acute distress.     Appearance: Normal appearance. She is not ill-appearing, toxic-appearing or diaphoretic.   HENT:      Head: Normocephalic.   Cardiovascular:      Rate and Rhythm: Normal rate.   Pulmonary:      Effort: Pulmonary effort is normal. No respiratory distress.   Abdominal:      General: Abdomen is flat. Bowel sounds are normal. There is no distension.      Palpations: Abdomen is soft. There is no mass.      Tenderness: There is no abdominal tenderness. There is no right CVA tenderness, left CVA tenderness, guarding or rebound.      Hernia: No hernia is present.   Genitourinary:     Vagina: Normal.      Comments: Patient is not .  There is no bleeding or discharge on external exam.  Musculoskeletal:         General: Normal range of motion.      Cervical back: Normal range of motion.   Skin:     General: Skin is warm and dry.      Capillary Refill: Capillary refill takes less than 2 seconds.   Neurological:      General: No focal deficit present.      Mental Status: She is alert and oriented to person, place, and time.   Psychiatric:         Mood and Affect: Mood normal.         Behavior: Behavior normal.         Thought Content: Thought content normal.         Judgment: Judgment normal.         ED Course  Labs Reviewed - No data to display           MDM    Medical Decision Making  Differential diagnoses reflecting the complexity of care include but are not limited to: Labor, UTI, vaginosis.    Comorbidities that add complexity to management include: Pregnancy  History obtained by an independent source was from: Patient  Patient is well appearing, non-toxic and in no acute distress.  Vital signs are stable.     29-year-old female who is G3, P2 who is currently 37 weeks pregnant and scheduled for  on  presents to the immediate care with abdominal cramping and low pelvic pressure.  Visual exam completed -patient is not .  She does not have any vaginal discharge or bleeding noted on external visual exam.  Given the limitations of the immediate care and the likely need for admission/observation/evaluation of potential labor the patient will be sent to labor and delivery via the emergency department at [facility].  I did also send patient's obstetrician a message in [email or web address].    Patient discussed with Dr. Dawson on the phone who agrees with this plan.          Disposition and Plan     Clinical Impression:  1. 37 weeks gestation of pregnancy (HCC)    2. Abdominal cramping         Disposition:  Ic to ed  2025  9:37 am    Follow-up:  No follow-up provider specified.        Medications Prescribed:  Discharge Medication List as of 2025  9:39 AM

## 2025-05-28 NOTE — ANESTHESIA POSTPROCEDURE EVALUATION
Patient: Davon Fernandez    Procedure Summary       Date: 25 Room / Location: [facility] L+D OR  [facility] L+D OR    Anesthesia Start:  Anesthesia Stop:     Procedures:        SECTION (Abdomen)      BILATERAL SALPINGECTOMY (Abdomen) Diagnosis: (same as pre-op)    Surgeons: Kimber Herrera MD Anesthesiologist: Noé Estrella MD    Anesthesia Type: spinal ASA Status: 2            Anesthesia Type: spinal    Vitals Value Taken Time   /57 25 14:45   Temp 98 °F (36.7 °C) 25 14:45   Pulse 76 25 14:45   Resp 15 25 14:45   SpO2 100 % 25 14:45   Vitals shown include unfiled device data.    [facility] AN Post Evaluation:   Patient Evaluated in PACU  Patient Participation: complete - patient participated  Level of Consciousness: awake  Pain Score: 0  Pain Management: adequate  Airway Patency:patent  Dental exam unchanged from preop  Yes    Cardiovascular Status: acceptable  Respiratory Status: acceptable  Postoperative Hydration acceptable      NOÉ ESTRELLA MD  2025 2:45 PM

## 2025-05-29 NOTE — PROGRESS NOTES
[facility]  part of [facility]    OB/GYNE Progress Note      [facility] Doe Dietrich Patient Status:  Inpatient    6/10/1995 MRN [id number]   Location [facility] 3SE Attending Kimber Herrera MD   Hosp Day # 1 PCP None Pcp          Subjective     No c/o    Review of Systems:  Constitutional: feeling well        Objective   Vital signs in last 24 hours:  Temp:  [97.4 °F (36.3 °C)-98.7 °F (37.1 °C)] 98.6 °F (37 °C)  Pulse:  [47-88] 67  Resp:  [13-21] 16  BP: (104-152)/(57-92) 113/75  SpO2:  [97 %-100 %] 98 %    Input/Output:    Intake/Output Summary (Last 24 hours) at 2025 1009  Last data filed at 2025 0615  Gross per 24 hour   Intake 1370.21 ml   Output 1816 ml   Net -445.79 ml       Weight (Last 6):  Wt Readings from Last 6 Encounters:   25 142 lb 6.4 oz (64.6 kg)   25 141 lb (64 kg)   25 139 lb (63 kg)   25 136 lb (61.7 kg)   25 135 lb (61.2 kg)   25 129 lb (58.5 kg)     There is no height or weight on file to calculate BMI.     Exam:   Constitutional: comfortable  Uterus: fundus firm and fundus below umbilicus  Wound/Incision:  clean, dry and intact  Extremities: No edema  Negative Kaye's sign.          Results:     Lab Results   Component Value Date    TREPONEMALAB Nonreactive 2025    ABO O 2025    RH Positive 2025    WBC 10.9 2025    HGB 8.8 (L) 2025    HCT 27.9 (L) 2025    .0 2025    CREATSERUM 0.51 (L) 10/19/2024    BUN 18 10/19/2024     10/19/2024    K 3.6 10/19/2024     10/19/2024    CO2 25.0 10/19/2024    GLU 97 10/19/2024    CA 8.9 10/19/2024    ALB 4.4 10/19/2024    ALKPHO 49 10/19/2024    BILT 0.3 10/19/2024    TP 7.2 10/19/2024    AST 14 10/19/2024    ALT 11 10/19/2024    T4F 0.9 2022    TSH 3.130 2022    LIP 48 10/19/2024       Lab Results   Component Value Date    COLORUR Light-Yellow 10/19/2024    CLARITY Clear 10/19/2024    SPECGRAVITY  1.029 10/19/2024    PROUR Trace (A) 10/19/2024    GLUUR 30 (A) 10/19/2024    KETUR Negative 10/19/2024    BILUR Negative 10/19/2024    BLOODURINE Negative 10/19/2024    NITRITE Negative 10/19/2024    UROBILINOGEN Normal 10/19/2024    LEUUR Negative 10/19/2024       No results found.      Assessment/Plan      delivery delivered (HCC)  Pod 1      Encounter for sterilization  Hb stable              Discussed with: patient     Plan discussed with patient who verbalizes understanding and agreement.          Alvino Lucas MD  2025  10:09 AM

## 2025-05-29 NOTE — PAYOR COMM NOTE
--------------  ADMISSION REVIEW     Payor: [facility]  Subscriber #:  [id number]  Authorization Number: [id number]    Admit date: 25  Admit time: 12:20 PM            Davon Monson #[id number]  Admission Info: Inpatient (Adm: 25)  Hospital Account: [id number]  Description: 29 year old F Primary Service: Labor and Delivery Unit Info: [facility] M/B     History and Physical    H&P signed by Kimber Herrera MD at 2025 12:51 PM    Author: Kimber Herrera MD Service: OB/Gyn Author Type: Physician   Filed: 2025 12:51 PM Date of Service: 2025 12:49 PM Status: Signed   : Kimber Herrera MD (Physician)         HISTORY AND PHYSICAL                                          Chief Complaint:  Scheduled C/S      History of Present Illness:    Davon Doe Dietrich is a  29 year old y/o   @  37w6d wks presents for contractions, r/o labor. She was scheduled for repeat c/s and sterilization on . Starting paris since 3am. +bloody show. Denies lof. +FM.  Feels like ctx getting stronger and having more pain over previous incision.           Lab Results   Component Value Date     GBS Negative 2025         [Past Medical History]    [Past Medical History]   Chicken pox     Childhood    Depression     No therapy or meds        [Past Surgical History]    [Past Surgical History]        Procedure Laterality Date             2017     delivery only                          OB History    Para Term  AB Living    4 2 2 0 1 2    SAB IAB Ectopic Multiple Live Births     0 0 1 0 2              [Allergies]    [Allergies]  No Known Allergies     [Social Hx on file]    [Social Hx on file]  Social History        Socioeconomic History    Marital status:        Spouse name: Arnulfo Jaramillo    Number of children: 1    Years of education: 9    Highest education level: 9th grade    Occupational History    Occupation: Homemaker   Tobacco Use    Smoking status: Never       Passive exposure: Never    Smokeless tobacco: Never   Vaping Use    Vaping status: Never Used   Substance and Sexual Activity    Alcohol use: Never    Drug use: Never   Other Topics Concern    Blood Transfusions No    Caffeine Concern No    Occupational Exposure No    Special Diet No    Exercise Yes       Comment: walking     Seat Belt Yes        [Family History]    [Family History]        Problem Relation Age of Onset    Diabetes Father      No Known Problems Mother      No Known Problems Maternal Grandmother      No Known Problems Maternal Grandfather      No Known Problems Paternal Grandmother      No Known Problems Paternal Grandfather                  Current Outpatient Medications   Medication Instructions    BREAST PUMP Does not apply Misc Please supply patient with Double electric breast pump covered by insurance HERMAN (06/12/25)    Ferrous Sulfate 325 mg, Oral, Daily with breakfast    Vitamin D 50 mcg, Oral, Daily               Objective      Physical Exam:  Vitals:     /82 (BP Location: Left arm)   Pulse 72   Temp 98.7 °F (37.1 °C) (Oral)   Resp 17   LMP 08/19/2024 (Approximate)         Review of Systems:  Review of Systems   Constitutional: Negative.    Respiratory: Negative.    Gastrointestinal: Negative.    Genitourinary: Negative.    Neurological: Negative.    Hematological: Negative.          Physical Exam   Constitutional: She is oriented to person, place, and time. She appears well-developed and well-nourished.   Cardiovascular: Normal rate and regular rhythm.   Pulmonary/Chest: Effort normal and breath sounds normal.   Abdominal: Soft.   Genitourinary:   Genitourinary Comments: Gravid uterus   FHT Category 1   Norrie  uc q 2-4 min  Musculoskeletal: Normal range of motion.   Neurological: She is alert and oriented to person, place, and time.   Skin: Skin is warm and dry.   Psychiatric: She has a normal  mood and affect.      CBC:          Lab Results   Component Value Date     WBC 9.7 2025     WBC 6.0 2025     WBC 7.5 2025            Lab Results   Component Value Date     HGB 10.7 (L) 2025     HGB 10.0 (L) 2025     HGB 10.8 (L) 2025            Lab Results   Component Value Date     .0 2025     .0 2025     .0 2025               ASSESSMENT AND PLAN:       Active Problems:    Pregnancy (HCC)           Preop abx weight based per protocol   Consent signed and on chart  Prenatal record reviewed  FWB R   The patient was counseled regarding surgery.   Risks of c/s including bleeding/need for blood transfusion (<1%), infection (5-10%), damage to other organs/bowel/bladder/ureters (<1%).  Benefits, alternatives, & indications were also discussed.  All questions were answered.  Declines REGINA.         Kimber Herrera MD            Signed by Kimber Herrera MD on 2025 12:51 PM         OR REPORT     Operative Report signed by Kimber Herrera MD at 2025  3:34 PM    Author: Kimber Herrera MD Service: OB/Gyn Author Type: Physician   Filed: 2025  3:34 PM Date of Service: 2025  1:59 PM Status: Signed   : Kimber Herrera MD (Physician)     [facility]  part of [facility]      Section Delivery / Operative Note           Davon Del Ramona Abhishek Dietrich Patient Status:  Inpatient    6/10/1995 MRN [id number]   Location [facility] Attending Kimber Herrera MD   Hosp Day # 0 PCP None Pcp      Pre Op Diagnosis:         IUP at 37w6d, , 37.6, Repeat C/S with bilateral salpingectomy     Post Op Diagnosis:       same as pre-op, Same - Delivered     Procedure:       repeat  LTCS and Bilateral Salpingectomy      Indications:  Patient is a 29 year old year old  at 37w6d who presented for labor,declined tolac. The risks, benefits and alternatives were d/w patient  including but not limited to risk of injury, infection, bleeding and subsequent  section deliveries. All questions and concerns were addressed. Patient provided verbal and written consent.      Surgeon:  Kimber Herrera MD     Assistant Surgeon:  Dr. Rivera      Anesthesia: spinal      Complications: none      Antibiotics:  Ancef 2 grams preoperatively     QBL: 286cc     Specimens: Placenta, cord gases and cord blood     Findings: normal uterus, normal tubes bilaterally, normal ovaries bilaterally. Live male infant, Nuchal Cord:  none  clear amniotic fluid noted.     Infant:  Date of Delivery: 2025   Time of Delivery: 2:04 PM  Delivery Type: Caesarean Section     Infant Sex  Information for the patient's :  Alexi Monson [[id number]]   male  Infant Birthweight  Information for the patient's :  Alexi Monson [[id number]]   6 lb 8.8 oz (2.97 kg)      Apgars:  1 minute: 9               5 minutes: 9                Placenta  Delivery: spontaneous  Placenta to Pathology: no     Cord:  Cord Gases Submitted: yes  Cord Blood/Tissue Collection: yes     Procedure:   After informed consent was obtained, the patient was taken to the operating room, prepped and draped in the usual sterile fashion. SCDs and a mak catheter were placed and anesthesia was found to be adequate. Two grams of ancef  were given for infection prophylaxis. She was prepared and draped in the dorsal supine position with a leftward tilt. A time out was performed. A pfannenstiel skin incision was made and carried down to the fascia. The fascia was incised and extended laterally with Morris scissors. The superior aspect of the fascia was grasped with kocher clamps, and the rectus muscle was dissected off bluntly then sharply with morris scissors. In a similar fashion, the inferior aspect of the fascia was grasped with kocher clamps and the underlying rectus muscle was dissected off bluntly and then sharply with Morris  scissors. Hemostasis was achieved with the bovie. The rectus muscle was  in the midline down to the level of the pubic symphysis. The peritoneum was entered bluntly and extended laterally. There was good visualization of the bladder.      The Cnorad retractor was inserted and the vesicouterine peritoneum identified. The lower uterine segment was incised with a scalpel. The amniotic sac was ruptured and clear fluid noted. The incision was extended bluntly with superior and inferior traction.      The fetus was in cephalic presentation. The head was elevated out of the pelvis to the the hysterotomy site. Gentle fundal pressure was applied and the infant was delivered without difficulty. Delayed cord clamping for 30 seconds. The cord was clamped and cut. The infant was handed off to the pediatrician. IV oxytocin was initiated to facilitate uterine contractions. IM methergine was also given prophylactically due to hx of PPH. The placenta was delivered intact with manual massage of the uterine fundus. The inside of the uterus was wiped with a lap sponge to assure complete removal of placenta membranes. The uterus was exteriorized. The uterine incision was closed with a 0-vicryl suture in a running locked fashion. A second imbricating stitch with 0- monocryl was placed for added hemostasis.The uterus, tubes, and ovaries were normal appearing. At this time a bilateral salpingectomy was performed with the Ligasure in standard fashion. Good hemostasis noted and the uterus was returned to the abdomen. Re-inspection of the hysterotomy, peritomeum and rectus muscles was done and the bladder flap was bleeding. A figure of eight with 0 monocryl was placed and hemostasis achieved. Surgiceal powder was placed and excellent hemostasis was noted. The Conrad retractor was removed.      The fascia was closed with 0-vicryl suture in a running fashion. The subcutaneous tissue was copiously irrigated and any bleeding cauterized.  The subcutaneous tissue was closed with 2-0 plain catgut suture. The skin was repaired with 4-0 monocryl. Dermabond was placed on the skin. Tegaderm was placed over the skin. The uterus was expressed for excess clots and excellent hemostasis was noted.     All sponge, instrument and needle counts were correct x3. The patient toelrated the procedure well and was taken to the recovery room in a stable and satisfactory condition. The baby was in stable condition to the recovery room.      Specimen:  none     Drains: mak to dependant drainage     Condition:   stable     Complications: None; patient tolerated the procedure well.        Kimber Herrera MD  5/28/2025  3:14 PM             5/29       Date of Service: 5/29/2025 10:09 AM     Signed         [facility]  part of [facility]     OB/GYNE Progress Note           Subjective      No c/o     Review of Systems:  Constitutional: feeling well     Objective   Vital signs in last 24 hours:  Temp:  [97.4 °F (36.3 °C)-98.7 °F (37.1 °C)] 98.6 °F (37 °C)  Pulse:  [47-88] 67  Resp:  [13-21] 16  BP: (104-152)/(57-92) 113/75  SpO2:  [97 %-100 %] 98 %     Input/Output:     Intake/Output Summary (Last 24 hours) at 5/29/2025 1009  Last data filed at 5/29/2025 0615      Gross per 24 hour   Intake 1370.21 ml   Output 1816 ml   Net -445.79 ml         Weight (Last 6):      Wt Readings from Last 6 Encounters:   05/20/25 142 lb 6.4 oz (64.6 kg)   05/08/25 141 lb (64 kg)   04/28/25 139 lb (63 kg)   04/09/25 136 lb (61.7 kg)   03/27/25 135 lb (61.2 kg)   02/24/25 129 lb (58.5 kg)      There is no height or weight on file to calculate BMI.      Exam:   Constitutional: comfortable  Uterus: fundus firm and fundus below umbilicus  Wound/Incision:  clean, dry and intact  Extremities: No edema  Negative Kaye's sign.        Results:            Lab Results   Component Value Date     TREPONEMALAB Nonreactive 05/28/2025     ABO O 05/28/2025     RH Positive 05/28/2025     WBC  10.9 2025     HGB 8.8 (L) 2025     HCT 27.9 (L) 2025     .0 2025     CREATSERUM 0.51 (L) 10/19/2024     BUN 18 10/19/2024      10/19/2024     K 3.6 10/19/2024      10/19/2024     CO2 25.0 10/19/2024     GLU 97 10/19/2024     CA 8.9 10/19/2024     ALB 4.4 10/19/2024     ALKPHO 49 10/19/2024     BILT 0.3 10/19/2024     TP 7.2 10/19/2024     AST 14 10/19/2024     ALT 11 10/19/2024     T4F 0.9 2022     TSH 3.130 2022     LIP 48 10/19/2024               Lab Results   Component Value Date     COLORUR Light-Yellow 10/19/2024     CLARITY Clear 10/19/2024     SPECGRAVITY 1.029 10/19/2024     PROUR Trace (A) 10/19/2024     GLUUR 30 (A) 10/19/2024     KETUR Negative 10/19/2024     BILUR Negative 10/19/2024     BLOODURINE Negative 10/19/2024     NITRITE Negative 10/19/2024     UROBILINOGEN Normal 10/19/2024     LEUUR Negative 10/19/2024         No results found.      Assessment/Plan      delivery delivered (HCC)  Pod 1       Encounter for sterilization  Hb stable                    Discussed with: patient      Plan discussed with patient who verbalizes understanding and agreement.            Alvino Lucas MD  2025  10:09 AM                        MEDICATIONS ADMINISTERED IN LAST 1 DAY:  acetaminophen (Tylenol Extra Strength) tab 1,000 mg       Date Action Dose Route User    2025 0627 Given 1,000 mg Oral Evon Hastings RN    2025 0032 Given 1,000 mg Oral Evon Hastings, SJ          azithromycin (Zithromax) 500 mg in sodium chloride 0.9% 250mL IVPB premix       Date Action Dose Route User    2025 1356 Given 500 mg Intravenous Anant Bradford MD          bupivacaine in dextrose (Marcaine) 0.75-8.25 % intrathecal/spinal injection       Date Action Dose Route User    2025 1352 Given 1.3 mL Intrathecal Anant Bradford MD          ceFAZolin (Ancef) 2g in 10mL IV syringe premix       Date Action Dose Route User    2025 1351 Given 2 g  Intravenous Anant Bradford MD          dexamethasone (Decadron) 4 MG/ML injection       Date Action Dose Route User    5/28/2025 1423 Given 4 mg Intravenous José Frost MD          docusate sodium (Colace) cap 100 mg       Date Action Dose Route User    5/29/2025 0627 Given 100 mg Oral Evon Hastings RN          gabapentin (Neurontin) cap 300 mg       Date Action Dose Route User    5/29/2025 0903 Given 300 mg Oral Natalie Barth RN    5/28/2025 1756 Given 300 mg Oral Pat Glez RN          HYDROmorphone (Dilaudid) 1 MG/ML injection 0.6 mg       Date Action Dose Route User    5/28/2025 1756 Given 0.6 mg Intravenous Pat Glez RN          ketorolac (Toradol) 30 MG/ML injection 30 mg       Date Action Dose Route User    5/28/2025 1636 Given 30 mg Intravenous Eveline Kelly RN          ketorolac (Toradol) 30 MG/ML injection 30 mg       Date Action Dose Route User    5/29/2025 1138 Given 30 mg Intravenous Natalie Barth RN    5/29/2025 0420 Given 30 mg Intravenous Evon Hastings RN    5/28/2025 2231 Given 30 mg Intravenous Evon Hastings RN          lactated ringers infusion       Date Action Dose Route User    5/28/2025 1341 Continued by Anesthesia (none) Intravenous Anant Bradford MD    5/28/2025 1340 New Bag (none) Intravenous Anant Bradford MD          lactated ringers infusion       Date Action Dose Route User    5/28/2025 1715 New Bag (none) Intravenous Eveline Kelly RN    5/28/2025 1446 Restarted (none) Intravenous Eveline Kelly RN          lidocaine (Xylocaine) 1 % injection       Date Action Dose Route User    5/28/2025 1350 Given 3 mL Intradermal Anant Bradford MD          methylergonovine (Methergine) 0.2 mg/mL injection       Date Action Dose Route User    5/28/2025 1404 Given 0.2 mg Intramuscular Anant Bradford MD          morphINE (PF) 1 MG/ML injection       Date Action Dose Route User    5/28/2025 3078 Given 0.3 mg Intrathecal Anant Bradford MD          ondansetron (Zofran) 4 MG/2ML  injection       Date Action Dose Route User    5/28/2025 1423 Given 4 mg Intravenous José Frost MD          oxyTOCIN in sodium chloride 0.9% (Pitocin) 30 Units/500mL infusion premix       Date Action Dose Route User    5/28/2025 1550 Rate/Dose Change 62.5 prema-units/min Intravenous Eveline Kelly RN    5/28/2025 1450 Rate/Dose Change 300 prema-units/min Intravenous Eveline Kelly RN    5/28/2025 1446 Restarted 600 prema-units/min Intravenous Eveline Kelly RN          oxyTOCIN in sodium chloride 0.9% (Pitocin) 30 Units/500mL infusion premix       Date Action Dose Route User    5/28/2025 1406 New Bag 600 mL/hr Intravenous Anant Bradford MD          oxyTOCIN in sodium chloride 0.9% (Pitocin) 30 Units/500mL infusion premix       Date Action Dose Route User    5/28/2025 2048 New Bag 62.5 prema-units/min Intravenous Evon Hastings RN    5/28/2025 1715 Restarted 62.5 prema-units/min Intravenous Eveline Kelly RN          phenylephrine (Maxwell-Synephrine) 10 MG/ML injection       Date Action Dose Route User    5/28/2025 1358 Given 50 mcg Intravenous Anant Bradford MD          simethicone (Mylicon) chewable tab 80 mg       Date Action Dose Route User    5/29/2025 0903 Given 80 mg Oral Natalie Barth RN            Vitals (last day)       Date/Time Temp Pulse Resp BP SpO2 Weight O2 Device O2 Flow Rate (L/min) [facility]    05/29/25 0915 98.6 °F (37 °C) 67 16 113/75 98 % -- None (Room air) --     05/29/25 0420 98.2 °F (36.8 °C) 59 14 107/73 -- -- None (Room air) --     05/29/25 0030 98.6 °F (37 °C) 63 14 106/67 99 % -- None (Room air) --     05/28/25 2015 -- 58 -- 126/82 -- -- -- --     05/28/25 2000 98.4 °F (36.9 °C) 60 16 124/92 99 % -- None (Room air) --     05/28/25 1735 -- -- -- 140/92 -- -- -- -- AB    05/28/25 1730 98.3 °F (36.8 °C) 50 14 136/90 100 % -- None (Room air) -- AB    05/28/25 1645 -- 53 20 104/79 100 % -- None (Room air) --     05/28/25 1630 -- 54 21 152/81 99 % -- None (Room air) --     05/28/25  1615 -- 50 16 128/78 100 % -- None (Room air) -- AG    05/28/25 1600 -- 47 17 130/79 99 % -- None (Room air) -- AG    05/28/25 1545 -- 57 15 114/83 100 % -- None (Room air) -- AG    05/28/25 1530 -- 60 21 123/72 99 % -- None (Room air) -- AG    05/28/25 1515 -- 67 15 109/65 100 % -- None (Room air) -- AG    05/28/25 1500 -- 68 13 109/75 100 % -- None (Room air) -- AG    05/28/25 1450 -- 71 21 107/73 100 % -- None (Room air) -- AG    05/28/25 1446 97.4 °F (36.3 °C) 74 15 104/66 100 % -- None (Room air) -- AG    05/28/25 1445 98 °F (36.7 °C) 88 16 104/57 97 % -- -- --     05/28/25 1045 98.7 °F (37.1 °C) 72 17 119/82 -- -- -- --

## 2025-05-30 NOTE — CM/SW NOTE
TEE self referral for Medicaid  SW met with patient bedside.  SW used language line for translation (ID# [id number])  SW confirmed face sheet contact as correct.    Baby boy name: Truman   Date & time of delivery: 5/28/25 2:04 PM  Delivery method: Caesarean section  Siblings age: 7 and 3 y.o.    Patient employed: SAHM   Length of maternity leave: NA    Father of baby employed: Yes  Length of paternity leave: 2 weeks    Breast or formula feed: Both    Pediatrician: Dr. Armstrong    Infant Insurance: [place] Medicaid  Change HC contacted: No    Mental Health History: Denies    Medications: Denies    Therapist: Akbar    Psychiatrist: Akbar OLIVEIRA discussed signs, symptoms and risks associated with post partum depression & anxiety.  SW provided pt with PMAD resources.  Other resources provided: MOM line, Mood Boost, SDOH [place], WIC [place] Medicaid flyer     Patient support system: FOB    Patient denied current questions/needs from SW.    SW to remain available for support and/or discharge planning.    Haylee Mahajan MSW, LSW   [id number]

## 2025-05-30 NOTE — PLAN OF CARE
Problem: GASTROINTESTINAL - ADULT  Goal: Minimal or absence of nausea and vomiting  Description: INTERVENTIONS:  - Maintain adequate hydration with IV or PO as ordered and tolerated  - Nasogastric tube to low intermittent suction as ordered  - Evaluate effectiveness of ordered antiemetic medications  - Provide nonpharmacologic comfort measures as appropriate  - Advance diet as tolerated, if ordered  - Obtain nutritional consult as needed  - Evaluate fluid balance  Outcome: Completed  Goal: Maintains or returns to baseline bowel function  Description: INTERVENTIONS:  - Assess bowel function  - Maintain adequate hydration with IV or PO as ordered and tolerated  - Evaluate effectiveness of GI medications  - Encourage mobilization and activity  - Obtain nutritional consult as needed  - Establish a toileting routine/schedule  - Consider collaborating with pharmacy to review patient's medication profile  Outcome: Completed     Problem: GENITOURINARY - ADULT  Goal: Absence of urinary retention  Description: INTERVENTIONS:  - Assess patient’s ability to void and empty bladder  - Monitor intake/output and perform bladder scan as needed  - Follow urinary retention protocol/standard of care  - Consider collaborating with pharmacy to review patient's medication profile  - Implement strategies to promote bladder emptying  Outcome: Completed     Problem: POSTPARTUM  Goal: Long Term Goal:Experiences normal postpartum course  Description: INTERVENTIONS:  - Assess and monitor vital signs and lab values.  - Assess fundus and lochia.  - Provide ice/sitz baths for perineum discomfort.  - Monitor healing of incision/episiotomy/laceration, and assess for signs and symptoms of infection and hematoma.  - Assess bladder function and monitor for bladder distention.  - Provide/instruct/assist with pericare as needed.  - Provide VTE prophylaxis as needed.  - Monitor bowel function.  - Encourage ambulation and provide assistance as needed.  -  Assess and monitor emotional status and provide social service/psych resources as needed.  - Utilize standard precautions and use personal protective equipment as indicated. Ensure aseptic care of all intravenous lines and invasive tubes/drains.  - Obtain immunization and exposure to communicable diseases history.  5/30/2025 1454 by Natalie Barth, RN  Outcome: Completed  5/30/2025 1453 by Natalie Barth, RN  Outcome: Progressing  Goal: Optimize infant feeding at the breast  Description: INTERVENTIONS:  - Initiate breast feeding within first hour after birth.   - Monitor effectiveness of current breast feeding efforts.  - Assess support systems available to mother/family.  - Identify cultural beliefs/practices regarding lactation, letdown techniques, maternal food preferences.  - Assess mother's knowledge and previous experience with breast feeding.  - Provide information as needed about early infant feeding cues (e.g., rooting, lip smacking, sucking fingers/hand) versus late cue of crying.  - Discuss/demonstrate breast feeding aids (e.g., infant sling, nursing footstool/pillows, and breast pumps).  - Encourage mother/other family members to express feelings/concerns, and actively listen.  - Educate father/SO about benefits of breast feeding and how to manage common lactation challenges.  - Recommend avoidance of specific medications or substances incompatible with breast feeding.  - Assess and monitor for signs of nipple pain/trauma.  - Instruct and provide assistance with proper latch.  - Review techniques for milk expression (breast pumping) and storage of breast milk. Provide pumping equipment/supplies, instructions and assistance, as needed.  - Encourage rooming-in and breast feeding on demand.  - Encourage skin-to-skin contact.  - Provide LC support as needed.  - Assess for and manage engorgement.  - Provide breast feeding education handouts and information on community breast feeding support.   5/30/2025 1454  by Natalie Barth, RN  Outcome: Completed  2025 by Natalie Barth, RN  Outcome: Progressing  Goal: Establishment of adequate milk supply with medication/procedure interruptions  Description: INTERVENTIONS:  - Review techniques for milk expression (breast pumping).   - Provide pumping equipment/supplies, instructions, and assistance until it is safe to breastfeed infant.  Outcome: Completed  Goal: Appropriate maternal -  bonding  Description: INTERVENTIONS:  - Assess caregiver- interactions.  - Assess caregiver's emotional status and coping mechanisms.  - Encourage caregiver to participate in  daily care.  - Assess support systems available to mother/family.  - Provide /case management support as needed.  2025 by Natalie Barth, RN  Outcome: Completed  2025 by Natalie Barth, RN  Outcome: Progressing

## 2025-05-30 NOTE — DISCHARGE SUMMARY
DISCHARGE SUMMARY      Date of Admission:  2025  Date of Discharge: 2025          Admission Diagnoses:  Repeat HERMAN    [employer] will be the assist  Pregnancy ([facility])  Discharge Diagnoses:  Active Problems:     delivery delivered ([facility])    Encounter for sterilization             Operations/Procedures this visit:   Section       Hospital Course:    The patient presented to L&D and underwent c/s - see op note for details.  She delivered a viable infant. Postoperatively the patient did very well.  Her hemoglobin stabilized.  By POD # 2 the patient was feeling well, tolerating a regular diet, passing flatus, and ambulating and voiding without difficulty.  Her vitals had remained stable & she was afebrile.  Her incision was clean, dry, & intact without evidence of infection or erythema.  The patient desired discharge on POD # 2.       Condition at Discharge:  /86 (BP Location: Left arm)   Pulse 67   Temp 98.7 °F (37.1 °C) (Oral)   Resp 16   LMP 2024 (Approximate)   SpO2 98%   Breastfeeding Yes     Physical Exam   Gen - NAD  Abdomen -soft, ND, NT  Fundus - firm, NT  Incision - C/D/I  Extremeties - NT    Discharge Disposition:  Home or Self Care     Discharge Medications:     Discharge Medications        START taking these medications        Instructions Prescription details   acetaminophen 500 MG Tabs  Commonly known as: Tylenol Extra Strength      Take 2 tablets (1,000 mg total) by mouth every 6 (six) hours as needed for Pain.   Quantity: 120 tablet  Refills: 0     gabapentin 300 MG Caps  Commonly known as: Neurontin      Take 1 capsule (300 mg total) by mouth every 8 (eight) hours as needed (For breakthrough moderate pain).   Quantity: 20 capsule  Refills: 0     ibuprofen 600 MG Tabs  Commonly known as: Motrin      Take 1 tablet (600 mg total) by mouth every 6 (six) hours as needed for Pain.   Quantity: 120 tablet  Refills: 0     senna-docusate 8.6-50 MG  Tabs  Commonly known as: Senokot-S      Take 1 tablet by mouth daily.   Quantity: 90 tablet  Refills: 0            CONTINUE taking these medications        Instructions Prescription details   Breast Pump Misc      Please supply patient with Double electric breast pump covered by insurance HERMAN (06/12/25)   Quantity: 1 each  Refills: 0     Ferrous Sulfate 325 (65 Fe) MG Tabs      Take 1 tablet (325 mg total) by mouth daily with breakfast.   Quantity: 30 tablet  Refills: 3     Vitamin D 50 MCG (2000 UT) Tabs      Take 50 mcg by mouth daily.   Quantity: 30 tablet  Refills: 3            STOP taking these medications      Calcium 500 MG Tabs               ASK your doctor about these medications        Instructions Prescription details   Ferralet 90 90-1 MG Tabs  Ask about: Should I take this medication?      Take 1 tablet by mouth every other day.   Stop taking on: April 26, 2025  Quantity: 15 tablet  Refills: 9     fluconazole 150 MG Tabs  Commonly known as: Diflucan  Ask about: Should I take this medication?      Take 1 tablet (150 mg total) by mouth once for 1 dose.   Stop taking on: April 30, 2025  Quantity: 1 tablet  Refills: 0     fluconazole 150 MG Tabs  Commonly known as: Diflucan  Ask about: Should I take this medication?      Take 1 tablet (150 mg total) by mouth once for 1 dose.   Stop taking on: May 21, 2025  Quantity: 1 tablet  Refills: 0     Prenatal Vitamins 28-0.8 MG Tabs  Ask about: Should I take this medication?      Take 1 tablet by mouth daily.   Stop taking on: May 25, 2025  Quantity: 90 tablet  Refills: 2               Where to Get Your Medications        These medications were sent to [id number] - [place], [place] - [address] AT [facility] [place], [phone], [phone]  [address] [facility] [address]      Phone: [phone]   acetaminophen 500 MG Tabs  gabapentin 300 MG Caps  ibuprofen 600 MG Tabs  senna-docusate 8.6-50 MG Tabs              Plan of Care:   Diet: Regular  As Tolerated  Activity:  Pelvic rest, No heavy lifting, No driving    Follow Up:  Postpartum visit in 2-3 weeks           Kimber Herrera MD, MD

## 2025-06-05 NOTE — TELEPHONE ENCOUNTER
Pt with hx of repeat  on  reporting watery diarrhea, gas, cramping x3 days. Has aprox 5 episodes per day after meals.  Has painful abdominal cramps prior to BMs ratd 8/10. Relieved after using restroom. Currently denied any pain, no nausea, chills, fever.   Last normal BM Monday.    Is breast feeding.   Per pt has no PCP.    Asked if she could take a medication for diarrhea.     Denied any pain or concerns with .     Pt informed on BRAT diet, replenishing electrolytes. Avoiding aggravating foods. Reviewed signs of dehydration.    Routing to on call provider to note and for further recommendations.

## 2025-06-05 NOTE — TELEPHONE ENCOUNTER
Pt informed of MD recommendation. Verbalized understanding. Stated her Diarrhea slowed down and hasn't had another episode since the last one reported. No further questions.

## 2025-06-17 NOTE — PROGRESS NOTES
Unable to reach pt at this time. Voicemail was not activated on cell phone. Cradle call letter sent via MY CHART.

## 2025-06-19 NOTE — PATIENT INSTRUCTIONS
[facility]  Our Lactation Consultants are available to continue helping you breastfeed.  To schedule an appointment at our office  Call [phone]  [facility]- Call [phone]     Suggestions to increase milk supply and release to breast pump     Review of your history and treatment of any medical conditions by your physician is also necessary.     Kangaroo mother care: Snuggle with your baby in skin to skin contact.  This helps to wake a sleepy baby and increases your milk supply.      Massage your breasts before nursing or pumping.  Practice relaxation techniques like visual imagery.     Increase the frequency of feedings and/or pumping sessions.    Most babies will feed 8-12 times in 24 hours with some periods of cluster feeding, therefore try to increase pumping frequency to at least 8-12 times every 24 hours.    Keep pumping log with 24 hour collection totals to monitor milk supply.  Once your milk is in (3-5 days post-delivery), pump until the sprays of milk slow to drops and for 1-2 minutes after to obtain the high fat milk.  This for most moms is 10-12 minutes, but pumping times may vary slightly.  A short pumping is better than no pumping!  If you have time you can “cluster pump” like a baby cluster feeds at times - pump for ten minutes, rest for ten minutes, then repeat 2-3 times. Or try pumping every hour for 10 minutes for 2-3 hours.     Pumping should not be painful.   Place flange on your breasts, centering your nipples.    Use correct size flange for your nipple size. Nipple should not rub on inner circumference of flange. If you need a different size flange, contact your nurse or the lactation department.   Maximum comfort suction is recommended. Begin with suction low then increase the suction to the highest suction that is comfortable for you. Remember pumping should never be painful.  If breast milk flow is minimal, try increasing  suction if it is comfortable to do so.  NOTE: Initially, in the colostrum phase amounts vary from a few drops to 30cc (1oz.).  If pumping is painful, turn the pump off, reposition flanges, check that the size is correct for your nipples, and adjust the suction. If nipple pain continues contact the lactation department or your doctor.     Ways to help your milk let down (flow) to the pump:   Massage your breasts for a few minutes prior to pumping and massage again if the milk flow slows down during the pumping session.   Hand expression of milk before, during and after pumping may allow you to obtain more milk than the pump alone.   When milk flow slows, increasing pump speed  back to 80 cpm (Ameda [place]) or switching pump back to “stimulation” phase (Medela pumps) to stimulate further milk ejection reflexes. Then decrease speed once milk begins to flow again.   Pump after you’ve seen, held, or touched your baby. Discuss “kangaroo care” with your baby’s nurse - holding your baby skin-to-skin on your chest when your baby is able.  Pump with baby close by or have a picture of your baby to look at while you pump  Inhale the scent of your baby from something your baby wore.  Sit back, close your eyes and imagine how sweet and soft your baby is; imagine “flowing things” like waterfalls, white rivers.  Listen to relaxing music. There is relaxation/meditation CD/tapes made especially for mothers pumping their breast milk.   Have a nice tall glass of water, juice, or milk close by to quench your thirst.  View the [facility] website videos: Maximizing Milk Production and Hand Expression   [email or web address]     Include night feedings and/or pumping sessions. Your hormone levels are higher at night.     Increasing milk flow to baby if breastfeeding:   Improve your baby’s position and latch.  Positioning:   Your hand at neck/shoulders, not the back of head.   Line chin with  the bottom of your areola  Latching on:  Express drops of milk onto your baby’s lips to encourage licking.  Point your nipple to baby’s nose and stroke lightly down the center of lips.  Wait for wide mouth with tongue cupped at bottom of mouth.  Chin should be deep into breast, with some room between nose and the breast.   If needed, gently draw chin down lower to deepen latch.  Compressing the breast when your baby sucks can increase milk flow.  Swallowing with most sucks (every 1-3 sucks) until satisfied at least 8-12 times every 24 hours.     Additional recommendations can be made on an individual basis depending on needs.  If you would like to meet with one of the Lactation Consultants while visiting your baby, you can request a visit by calling [phone] or notify your baby’s nurse for arrangements to be made.

## 2025-06-19 NOTE — PROGRESS NOTES
[facility] Doe Dietrich is a 30 year old female  here for 3 week post-partum visit.  Patient delivered a  male infant on 25 via repeat CSx.  And b/l salpingectomy    Pt states she is voiding freely, tolerating general diet, having normal bowel movements and minimal locia.  Pt has not been sexual active.  Patient had sterilization for contraception.    Patient is breast & bottle feeding.   Pt denies any significant breast tenderness/engorgement.  Patient denies symptoms of post partum depression, Tad  depression scale score - see notes.      OBSTETRIC HISTORY  OB History    Para Term  AB Living   4 3 3 0 1 3   SAB IAB Ectopic Multiple Live Births   0 0 1 0 3      # Outcome Date GA Lbr Scott/2nd Weight Sex Type Anes PTL Lv   4 Term 25 37w6d  6 lb 8.8 oz (2.97 kg) M Caesarean Spinal N LEBRON   3 Term 20 39w0d  6 lb 13.7 oz (3.11 kg) M Caesarean Spinal N LEBRON   2 Ectopic 21 4w0d             Birth Comments: treated with medication   1 Term 17 38w0d  7 lb 0.9 oz (3.2 kg) F Caesarean EPI  LEBRON      Complications: Fetal Intolerance       GYNE HISTORY        MEDICATIONS:  Medications - Current[1]    ALLERGIES:  Allergies[2]    PHYSICAL EXAM  Blood pressure 95/61, last menstrual period 2024, currently breastfeeding.  General:  Well nourished, well developed woman in no acute distress  Abdomen:  soft, nontender, no masses  Incision: c/d/i      ASSESSMENT/PLAN  30 year old female  here for post-partum visit  Pap UTD  Discussed pelvic floor physical therapy  C/o vulvar pruritus given mycolog    Discussed return to fertility and menses.  Patient counseled on contraceptive options.  Patient has chosen tubal ligation.  Patient to return for annual or prn.       [1]   Current Outpatient Medications:     ibuprofen 600 MG Oral Tab, Take 1 tablet (600 mg total) by mouth every 6 (six) hours as needed for Pain., Disp: 120 tablet, Rfl: 0    Ferrous  Sulfate 325 (65 Fe) MG Oral Tab, Take 1 tablet (325 mg total) by mouth daily with breakfast., Disp: 30 tablet, Rfl: 3    acetaminophen 500 MG Oral Tab, Take 2 tablets (1,000 mg total) by mouth every 6 (six) hours as needed for Pain. (Patient not taking: Reported on 6/19/2025), Disp: 120 tablet, Rfl: 0    senna-docusate 8.6-50 MG Oral Tab, Take 1 tablet by mouth daily., Disp: 90 tablet, Rfl: 0    gabapentin 300 MG Oral Cap, Take 1 capsule (300 mg total) by mouth every 8 (eight) hours as needed (For breakthrough moderate pain). (Patient not taking: Reported on 6/19/2025), Disp: 20 capsule, Rfl: 0    BREAST PUMP Does not apply Misc, Please supply patient with Double electric breast pump covered by insurance HERMAN (06/12/25) (Patient not taking: Reported on 6/19/2025), Disp: 1 each, Rfl: 0    Cholecalciferol (VITAMIN D) 50 MCG (2000 UT) Oral Tab, Take 50 mcg by mouth daily. (Patient not taking: Reported on 6/19/2025), Disp: 30 tablet, Rfl: 3  [2] No Known Allergies

## 2025-07-13 NOTE — DISCHARGE INSTRUCTIONS
Cephalexin 1 tablet 4 times a day for 7 days  Warm compresses 10 minutes at a time few times per day  Continue to breast-feed/pump  If you develop a fever or worsening symptoms, please go to the ER  Please follow-up with your OB/GYN in 2 days for recheck

## 2025-07-13 NOTE — ED INITIAL ASSESSMENT (HPI)
Pt reports right breast pain for past few weeks. C/o subjective temperature which began yesterday. Pt is breastfeeding.

## 2025-07-13 NOTE — ED PROVIDER NOTES
Patient Seen in: [facility]        History  Chief Complaint   Patient presents with    Breast Pain     Stated Complaint: Breast Pain,  Body Rash, R Arm Pain    Subjective:   Patient is a 30-year-old female, who is currently breast-feeding, who presents with right breast pain since yesterday, along with redness, and subjective fever.  No nausea, vomiting, diarrhea, or abdominal pain.  No MRSA history.  Reports she had something similar in the left side, for about 2 to 3 weeks, this has resolved on its own.        Objective:     Past Medical History:    Chicken pox    Childhood    Depression    No therapy or meds              Past Surgical History:   Procedure Laterality Date          2017     delivery only                      Social History     Socioeconomic History    Marital status:      Spouse name: Arnulfo Jaramillo    Number of children: 1    Years of education: 9    Highest education level: 9th grade   Occupational History    Occupation: Homemaker   Tobacco Use    Smoking status: Never     Passive exposure: Never    Smokeless tobacco: Never   Vaping Use    Vaping status: Never Used   Substance and Sexual Activity    Alcohol use: Never    Drug use: Never   Other Topics Concern    Blood Transfusions No    Caffeine Concern No    Occupational Exposure No    Special Diet No    Exercise Yes     Comment: walking     Seat Belt Yes     Social Drivers of Health     Food Insecurity: No Food Insecurity (2025)    NCSS - Food Insecurity     Worried About Running Out of Food in the Last Year: No     Ran Out of Food in the Last Year: No   Transportation Needs: No Transportation Needs (2025)    NCSS - Transportation     Lack of Transportation: No   Stress: No Stress Concern Present (2025)    Stress     Feeling of Stress : No   Housing Stability: Not At Risk (2025)    NCSS - Housing/Utilities     Has Housing: Yes     Worried About Losing Housing: No     Unable to  Get Utilities: No              Review of Systems   All other systems reviewed and are negative.      Positive for stated complaint: Breast Pain,  Body Rash, R Arm Pain  Other systems are as noted in HPI.  Constitutional and vital signs reviewed.      All other systems reviewed and negative except as noted above.                  Physical Exam    ED Triage Vitals [07/13/25 0803]   BP 96/61   Pulse 89   Resp 18   Temp 98.1 °F (36.7 °C)   Temp src Oral   SpO2 100 %   O2 Device None (Room air)       Current Vitals:   Vital Signs  BP: 96/61  Pulse: 89  Resp: 18  Temp: 98.1 °F (36.7 °C)  Temp src: Oral    Oxygen Therapy  SpO2: 100 %  O2 Device: None (Room air)            Physical Exam  Constitutional:       Appearance: Normal appearance.   Cardiovascular:      Rate and Rhythm: Normal rate and regular rhythm.   Pulmonary:      Effort: Pulmonary effort is normal.      Breath sounds: Normal breath sounds.   Chest:   Breasts:     Right: Swelling and tenderness present.   Lymphadenopathy:      Upper Body:      Right upper body: No supraclavicular adenopathy.   Neurological:      Mental Status: She is alert.         ED Course  Labs Reviewed - No data to display       MDM    Medical Decision Making  Differentials considered: Mastitis versus cellulitis versus neoplasm versus other    HPI and exam most consistent with right breast mastitis.  Patient is healthy appearing, normal vitals, afebrile.  Will start cephalexin, and warm compresses.  ER precautions were discussed.  She was advised to follow-up with her OB/GYN in 2 days for recheck.  Patient verbalized understanding and agreeable to plan of care.     used for the duration of this visit    Risk  Prescription drug management.        Disposition and Plan     Clinical Impression:  1. Mastitis         Disposition:  Discharge  7/13/2025  8:25 am    Follow-up:  No follow-up provider specified.        Medications Prescribed:  Discharge Medication List as of 7/13/2025  8:25  AM        START taking these medications    Details   cephALEXin 500 MG Oral Cap Take 1 capsule (500 mg total) by mouth 4 (four) times daily for 7 days., Normal, Disp-28 capsule, R-0                   Supplementary Documentation:

## (undated) NOTE — LETTER
VACCINE ADMINISTRATION RECORD  PARENT / GUARDIAN APPROVAL  Date: 2022  Vaccine administered to: [address] [facility] [address] [facility] Jo Dias     : 6/10/1995    MRN: [id number]    A copy of the appropriate Centers for Disease Control and Prevention Vaccine Information statement has been provided. I have read or have had explained the information about the diseases and the vaccines listed below. There was an opportunity to ask questions and any questions were answered satisfactorily. I believe that I understand the benefits and risks of the vaccine cited and ask that the vaccine(s) listed below be given to me or to the person named above (for whom I am authorized to make this request). VACCINES ADMINISTERED:  Tdap    I have read and hereby agree to be bound by the terms of this agreement as stated above. My signature is valid until revoked by me in writing. This document is signed by  [facility] Sandy Dias , relationship: Self on 2022.:                                                                                                                                         Parent / Ayleen Mk Signature                                                Date

## (undated) NOTE — LETTER
2025         RE:  Davon Fernandez  :  6/10/1995      To Whom It May Concern:    Davon Fernandez  is currently under our care for pregnancy.  It is permissible at her gestational age for dental work to be performed.  However, if x-rays are needed, please make sure that the abdomen is shielded appropriately, and thoroughly.      Analgesia is also permissible, as long as there is no vasoconstrictor used.  For post-treatment pain relief, Tylenol alone is acceptable.  If an antibiotic is needed, a penicillin derivative may be used.    If you have any additional concerns, do not hesitate to contact our office.    Sincerely,        Lady Patten MD

## (undated) NOTE — LETTER
[facility]  Administration of Anesthesia  1. Carlos Rosen, or _________________________________ acting on her behalf, (Patient) (Dependent/Representative) request to receive anesthesia for my pending procedure/operation/treatment. A physician (anesthesiologist) alone or an anesthesiologist working with a nurse anesthetist may administer my anesthesia. 2. I understand that my anesthesiologist is not an employee or agent of the hospital, but is an independent medical practitioner who has been permitted to use its facilities for the care and treatment of his/her patients. 3. I acknowledge that a physician from [facility] or their designate(s), recommended anesthesia for me using her/his medical judgment. The type(s) of anesthesia I may receive include:                a) General Anesthesia, b) Spinal/Epidural Anesthesia, c) Regional Anesthesia or d) Monitored Anesthesia Care. 4. If my spinal, regional or monitored anesthesia care (local) is not satisfactory for my comfort, or if my medical condition requires, I consent to the administration of general anesthesia. 5. I am aware that the practice of anesthesiology is not an exact science and that some foreseeable risks or consequences may occur. Some common risks/consequences include sore throat and hoarseness, nausea and vomiting, muscle soreness, backache, damage to the mouth/teeth/vocal cords and eye injury. I understand that more rare but serious potential risks of anesthesia include blood pressure changes, drug reactions, cardiac arrest, brain damage, paralysis or death. These risks apply to whether I have general, spinal/epidural, regional or monitored anesthesia care. 6. OBSTETRIC PATIENTS: Specific risks/consequences of spinal/epidural anesthesia may include itching, low blood pressure, difficulty urinating, slowing of the baby's heart rate and headache.  Rare risks include infections, high spinal block, spinal bleeding, seizure, cardiac arrest and death. 7. AWARENESS: I understand that it is possible (but unlikely) to have explicit memory of events from the operating room while under general anesthesia. 8. ELECTROCONVULSIVE THERAPY PATIENTS: This consent serves for all treatments in a single course of therapy. 9. I understand that I must inform my anesthesiologist when I last ate and/or drank to minimize the risk of anesthesia. 10. If I am pregnant, or may pregnant, I understand that elective surgery should be postponed until after the baby is born. Anesthetics cross the placenta and may temporarily anesthetize the baby. Although fetal complications of anesthesia during pregnancy are rare, they may include birth defects, premature labor, brain damage and death. 11. I certify that I informed the anesthesiologist, to the best of my ability, about medication I take including blood thinners, anticoagulants, herbal remedies, narcotics and recreational drugs (e.g. cocaine, marijuana, PCP). Failure to inform my anesthesiologist about these medicines may increase my risk of anesthetic complications. The nature and purpose of my anesthetic management was explained to me. I had the opportunity to ask questions and the answers and information provided meet my satisfaction.   I retain the right to withdraw this consent at any time prior to the administration of said anesthetic.    ___________________________________________________           _____________________________________________________  Patient Signature                                                                                      Witness Signature                ___________________________________________________           _____________________________________________________  Date/Time                                                                                               Responsible person in case of minor/ unconscious pt /Relationship    My signature below affirms that prior to the time of the procedure, I have explained to the patient and/or his/her guardian, the risks and benefits of undergoing anesthesia, as well as any reasonable alternatives.     ___________________________________________________            _____________________________________________________  Physician Signature                            Date/Time  Patient Name: Mala Rodriguez     : 6/10/1995     Printed: 2022      Medical Record #: [id number]                              Page 1 of 1    ----------ANESTHESIA CONSENT----------

## (undated) NOTE — LETTER
[facility]  Administration of Anesthesia  Davon URBINA agree to be cared for by a physician anesthesiologist alone and/or with a nurse anesthetist, who is specially trained to monitor me and give me medicine to put me to sleep or keep me comfortable during my procedure    I understand that my anesthesiologist and/or anesthetist is not an employee or agent of [facility] or [employer]. He or she works for [facility]    As the patient asking for anesthesia services, I agree to:  Allow the anesthesiologist (anesthesia doctor) to give me medicine and do additional procedures as necessary. Some examples are: Starting or using an “IV” to give me medicine, fluids or blood during my procedure, and having a breathing tube placed to help me breathe when I’m asleep (intubation). In the event that my heart stops working properly, I understand that my anesthesiologist will make every effort to sustain my life, unless otherwise directed by [facility] Do Not Resuscitate documents.  Tell my anesthesia doctor before my procedure:  If I am pregnant.  The last time that I ate or drank.  iii. All of the medicines I take (including prescriptions, herbal supplements, and pills I can buy without a prescription (including street drugs/illegal medications). Failure to inform my anesthesiologist about these medicines may increase my risk of anesthetic complications.  iv.If I am allergic to anything or have had a reaction to anesthesia before.  I understand how the anesthesia medicine will help me (benefits).  I understand that with any type of anesthesia medicine there are risks:  The most common risks are: nausea, vomiting, sore throat, muscle soreness, damage to my eyes, mouth, or teeth (from breathing tube placement).  Rare risks include: remembering what happened during my procedure, allergic reactions to medications, injury to my airway, heart, lungs,  vision, nerves, or muscles and in extremely rare instances death.  My doctor has explained to me other choices available to me for my care (alternatives).  Pregnant Patients (“epidural”):  I understand that the risks of having an epidural (medicine given into my back to help control pain during labor), include itching, low blood pressure, difficulty urinating, headache or slowing of the baby’s heart. Very rare risks include infection, bleeding, seizure, irregular heart rhythms and nerve injury.  Regional Anesthesia (“spinal”, “epidural”, & “nerve blocks”):  I understand that rare but potential complications include headache, bleeding, infection, seizure, irregular heart rhythms, and nerve injury.    _____________________________________________________________________________  Patient (or Representative) Signature/Relationship to Patient  Date   Time    _____________________________________________________________________________   Name (if used)    Language/Organization   Time    _____________________________________________________________________________  Nurse Anesthetist Signature     Date   Time  _____________________________________________________________________________  Anesthesiologist Signature     Date   Time  I have discussed the procedure and information above with the patient (or patient’s representative) and answered their questions. The patient or their representative has agreed to have anesthesia services.    _____________________________________________________________________________  Witness        Date   Time  I have verified that the signature is that of the patient or patient’s representative, and that it was signed before the procedure  Patient Name: Davon Fernandez     : 6/10/1995                 Printed: 2025 at 12:20 PM    Medical Record #: [id number]                                            Page 1 of 1  ----------ANESTHESIA CONSENT----------